# Patient Record
Sex: MALE | Race: BLACK OR AFRICAN AMERICAN | NOT HISPANIC OR LATINO | Employment: OTHER | URBAN - METROPOLITAN AREA
[De-identification: names, ages, dates, MRNs, and addresses within clinical notes are randomized per-mention and may not be internally consistent; named-entity substitution may affect disease eponyms.]

---

## 2018-09-25 ENCOUNTER — APPOINTMENT (EMERGENCY)
Dept: CT IMAGING | Facility: HOSPITAL | Age: 23
End: 2018-09-25

## 2018-09-25 ENCOUNTER — HOSPITAL ENCOUNTER (OUTPATIENT)
Facility: HOSPITAL | Age: 23
Setting detail: OBSERVATION
Discharge: HOME | End: 2018-09-26
Attending: SURGERY | Admitting: SURGERY

## 2018-09-25 ENCOUNTER — HOSPITAL ENCOUNTER (EMERGENCY)
Facility: HOSPITAL | Age: 23
End: 2018-09-25
Attending: EMERGENCY MEDICINE | Admitting: EMERGENCY MEDICINE

## 2018-09-25 VITALS
SYSTOLIC BLOOD PRESSURE: 135 MMHG | WEIGHT: 179 LBS | TEMPERATURE: 99.1 F | RESPIRATION RATE: 18 BRPM | HEART RATE: 62 BPM | OXYGEN SATURATION: 99 % | DIASTOLIC BLOOD PRESSURE: 64 MMHG

## 2018-09-25 DIAGNOSIS — S02.5XXA CLOSED FRACTURE OF TOOTH, INITIAL ENCOUNTER: ICD-10-CM

## 2018-09-25 DIAGNOSIS — K01.1 TOOTH IMPACTION: Primary | ICD-10-CM

## 2018-09-25 DIAGNOSIS — S02.650A: ICD-10-CM

## 2018-09-25 DIAGNOSIS — S02.650A: Primary | ICD-10-CM

## 2018-09-25 DIAGNOSIS — K01.1 TOOTH IMPACTION: ICD-10-CM

## 2018-09-25 DIAGNOSIS — S02.609A: ICD-10-CM

## 2018-09-25 PROCEDURE — 99284 EMERGENCY DEPT VISIT MOD MDM: CPT

## 2018-09-25 PROCEDURE — 70486 CT MAXILLOFACIAL W/O DYE: CPT

## 2018-09-25 PROCEDURE — 99219 PR INITIAL OBSERVATION CARE/DAY 50 MINUTES: CPT | Performed by: SURGERY

## 2018-09-25 RX ORDER — AMOXICILLIN 250 MG
2 CAPSULE ORAL DAILY
Status: DISCONTINUED | OUTPATIENT
Start: 2018-09-26 | End: 2018-09-26 | Stop reason: HOSPADM

## 2018-09-25 RX ORDER — OXYCODONE HYDROCHLORIDE 10 MG/1
10 TABLET ORAL EVERY 4 HOURS PRN
Status: DISCONTINUED | OUTPATIENT
Start: 2018-09-25 | End: 2018-09-26 | Stop reason: HOSPADM

## 2018-09-25 RX ORDER — SODIUM CHLORIDE 9 MG/ML
100 INJECTION, SOLUTION INTRAVENOUS CONTINUOUS
Status: DISCONTINUED | OUTPATIENT
Start: 2018-09-26 | End: 2018-09-26 | Stop reason: HOSPADM

## 2018-09-25 RX ORDER — GABAPENTIN 100 MG/1
100 CAPSULE ORAL 3 TIMES DAILY
Status: DISCONTINUED | OUTPATIENT
Start: 2018-09-25 | End: 2018-09-26 | Stop reason: HOSPADM

## 2018-09-25 RX ORDER — METHOCARBAMOL 500 MG/1
750 TABLET, FILM COATED ORAL ONCE
Status: COMPLETED | OUTPATIENT
Start: 2018-09-25 | End: 2018-09-25

## 2018-09-25 RX ORDER — CHLORHEXIDINE GLUCONATE 0.12 MG/ML
15 RINSE ORAL EVERY 12 HOURS SCHEDULED
Status: DISCONTINUED | OUTPATIENT
Start: 2018-09-25 | End: 2018-09-26 | Stop reason: HOSPADM

## 2018-09-25 RX ORDER — ONDANSETRON 2 MG/ML
4 INJECTION INTRAMUSCULAR; INTRAVENOUS EVERY 4 HOURS PRN
Status: DISCONTINUED | OUTPATIENT
Start: 2018-09-25 | End: 2018-09-26 | Stop reason: HOSPADM

## 2018-09-25 RX ORDER — ACETAMINOPHEN 325 MG/1
975 TABLET ORAL EVERY 8 HOURS SCHEDULED
Status: DISCONTINUED | OUTPATIENT
Start: 2018-09-25 | End: 2018-09-26 | Stop reason: HOSPADM

## 2018-09-25 RX ORDER — IBUPROFEN 400 MG/1
800 TABLET ORAL ONCE
Status: COMPLETED | OUTPATIENT
Start: 2018-09-25 | End: 2018-09-25

## 2018-09-25 RX ORDER — OXYCODONE HYDROCHLORIDE 5 MG/1
5 TABLET ORAL EVERY 4 HOURS PRN
Status: DISCONTINUED | OUTPATIENT
Start: 2018-09-25 | End: 2018-09-26 | Stop reason: HOSPADM

## 2018-09-25 RX ORDER — METHOCARBAMOL 500 MG/1
500 TABLET, FILM COATED ORAL EVERY 6 HOURS SCHEDULED
Status: DISCONTINUED | OUTPATIENT
Start: 2018-09-26 | End: 2018-09-26 | Stop reason: HOSPADM

## 2018-09-25 RX ADMIN — IBUPROFEN 800 MG: 400 TABLET ORAL at 18:46

## 2018-09-25 RX ADMIN — SODIUM CHLORIDE 100 ML/HR: 0.9 INJECTION, SOLUTION INTRAVENOUS at 23:48

## 2018-09-25 RX ADMIN — METHOCARBAMOL 750 MG: 500 TABLET ORAL at 18:45

## 2018-09-25 NOTE — LETTER
[address]  [address] [place] [address]  Dept: [phone]    September 26, 2018     Patient: James Gonsalez   YOB: 1995   Date of Visit: 9/25/2018       To Whom it May Concern:    James Gonsalez is under my professional care  He was seen in the hospital from 9/25/2018   to 09/26/18  He may return to work on 09/27/2018  If you have any questions or concerns, please don't hesitate to call           Sincerely,          Justus Beyer PA-C
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
## 2018-09-25 NOTE — ED NOTES
Patient does have a ride home at this time      Figueroa Eid, [address] [facility]  09/25/18 6839

## 2018-09-26 ENCOUNTER — ANESTHESIA EVENT (OUTPATIENT)
Dept: PERIOP | Facility: HOSPITAL | Age: 23
End: 2018-09-26

## 2018-09-26 ENCOUNTER — ANESTHESIA (OUTPATIENT)
Dept: PERIOP | Facility: HOSPITAL | Age: 23
End: 2018-09-26

## 2018-09-26 VITALS
HEIGHT: 74 IN | TEMPERATURE: 97.4 F | WEIGHT: 179.01 LBS | SYSTOLIC BLOOD PRESSURE: 122 MMHG | RESPIRATION RATE: 18 BRPM | HEART RATE: 60 BPM | DIASTOLIC BLOOD PRESSURE: 57 MMHG | BODY MASS INDEX: 22.97 KG/M2 | OXYGEN SATURATION: 98 %

## 2018-09-26 PROCEDURE — 99217 PR OBSERVATION CARE DISCHARGE MANAGEMENT: CPT | Performed by: SURGERY

## 2018-09-26 RX ORDER — AMOXICILLIN AND CLAVULANATE POTASSIUM 875; 125 MG/1; MG/1
1 TABLET, FILM COATED ORAL EVERY 12 HOURS SCHEDULED
Qty: 10 TABLET | Refills: 0 | Status: SHIPPED | OUTPATIENT
Start: 2018-09-26 | End: 2018-10-01

## 2018-09-26 RX ORDER — IBUPROFEN 600 MG/1
600 TABLET ORAL EVERY 8 HOURS PRN
Status: DISCONTINUED | OUTPATIENT
Start: 2018-09-26 | End: 2018-09-26 | Stop reason: HOSPADM

## 2018-09-26 RX ORDER — METHOCARBAMOL 500 MG/1
500 TABLET, FILM COATED ORAL EVERY 6 HOURS SCHEDULED
Qty: 25 TABLET | Refills: 0 | Status: SHIPPED | OUTPATIENT
Start: 2018-09-26 | End: 2020-09-11

## 2018-09-26 RX ORDER — ACETAMINOPHEN 325 MG/1
650 TABLET ORAL EVERY 6 HOURS PRN
Qty: 30 TABLET | Refills: 0 | Status: SHIPPED | OUTPATIENT
Start: 2018-09-26 | End: 2018-10-18 | Stop reason: HOSPADM

## 2018-09-26 RX ORDER — OXYCODONE HYDROCHLORIDE 5 MG/1
5 TABLET ORAL EVERY 4 HOURS PRN
Qty: 10 TABLET | Refills: 0 | Status: SHIPPED | OUTPATIENT
Start: 2018-09-26 | End: 2018-10-06

## 2018-09-26 RX ORDER — CHLORHEXIDINE GLUCONATE 0.12 MG/ML
15 RINSE ORAL 2 TIMES DAILY
Qty: 120 ML | Refills: 0 | Status: SHIPPED | OUTPATIENT
Start: 2018-09-26 | End: 2018-10-14

## 2018-09-26 RX ORDER — IBUPROFEN 600 MG/1
600 TABLET ORAL EVERY 8 HOURS PRN
Qty: 30 TABLET | Refills: 0 | Status: SHIPPED | OUTPATIENT
Start: 2018-09-26 | End: 2018-10-18 | Stop reason: HOSPADM

## 2018-09-26 RX ADMIN — METHOCARBAMOL 500 MG: 500 TABLET ORAL at 11:57

## 2018-09-26 RX ADMIN — ACETAMINOPHEN 975 MG: 325 TABLET, FILM COATED ORAL at 00:58

## 2018-09-26 RX ADMIN — OXYCODONE HYDROCHLORIDE 5 MG: 5 TABLET ORAL at 05:10

## 2018-09-26 RX ADMIN — SODIUM CHLORIDE 100 ML/HR: 0.9 INJECTION, SOLUTION INTRAVENOUS at 10:36

## 2018-09-26 RX ADMIN — SODIUM CHLORIDE 100 ML/HR: 0.9 INJECTION, SOLUTION INTRAVENOUS at 00:54

## 2018-09-26 RX ADMIN — OXYCODONE HYDROCHLORIDE 5 MG: 5 TABLET ORAL at 00:58

## 2018-09-26 RX ADMIN — HYDROMORPHONE HYDROCHLORIDE 0.5 MG: 1 INJECTION, SOLUTION INTRAMUSCULAR; INTRAVENOUS; SUBCUTANEOUS at 08:03

## 2018-09-26 RX ADMIN — GABAPENTIN 100 MG: 100 CAPSULE ORAL at 08:03

## 2018-09-26 RX ADMIN — METHOCARBAMOL 500 MG: 500 TABLET ORAL at 00:59

## 2018-09-26 RX ADMIN — CHLORHEXIDINE GLUCONATE 15 ML: 1.2 RINSE ORAL at 08:03

## 2018-09-26 RX ADMIN — CHLORHEXIDINE GLUCONATE 15 ML: 1.2 RINSE ORAL at 00:58

## 2018-09-26 RX ADMIN — OXYCODONE HYDROCHLORIDE 10 MG: 10 TABLET ORAL at 10:36

## 2018-09-26 RX ADMIN — METHOCARBAMOL 500 MG: 500 TABLET ORAL at 05:10

## 2018-09-26 RX ADMIN — IBUPROFEN 600 MG: 600 TABLET ORAL at 11:57

## 2018-09-26 RX ADMIN — ACETAMINOPHEN 975 MG: 325 TABLET, FILM COATED ORAL at 05:10

## 2018-09-26 RX ADMIN — Medication 2 TABLET: at 08:03

## 2018-09-26 NOTE — ASSESSMENT & PLAN NOTE
-patient declining surgery  -OMFS had extensive conversation with patient regarding nonsurgical option  -patient is still refusing surgery even with trauma team discussion  -will discharge patient with antibiotics for a course of 5 days  -continue sinus precautions
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
## 2018-09-26 NOTE — ED NOTES
Provider updated patient regarding admission/transfer status at this time      Selena Cooper RN  09/25/18 8691

## 2018-09-26 NOTE — EMTALA/ACUTE CARE TRANSFER
[facility]  [address] [address]  Dept: [phone]      EMTALA TRANSFER CONSENT    NAME Mikel TORRES 1995                              MRN [id number]    I have been informed of my rights regarding examination, treatment, and transfer   by Dr Halina Hayward MD    Benefits: Specialized equipment and/or services available at the receiving facility (Include comment)________________________ (Trauma service )    Risks: Potential deterioration of medical condition, Potential for delay in receiving treatment, Possible worsening of condition or death during transfer, Loss of IV, Increased discomfort during transfer      Transfer Request   I acknowledge that my medical condition has been evaluated and explained to me by the emergency department physician or other qualified medical person and/or my attending physician who has recommended and offered to me further medical examination and treatment  I understand the Hospital's obligation with respect to the treatment and stabilization of my emergency medical condition  I nevertheless request to be transferred  I release the Hospital, the doctor, and any other persons caring for me from all responsibility or liability for any injury or ill effects that may result from my transfer and agree to accept all responsibility for the consequences of my choice to transfer, rather than receive stabilizing treatment at the Hospital  I understand that because the transfer is my request, my insurance may not provide reimbursement for the services  The Hospital will assist and direct me and my family in how to make arrangements for transfer, but the hospital is not liable for any fees charged by the transport service    In spite of this understanding, I refuse to consent to further medical examination and treatment which has been offered to me, and request transfer to Accepting Facility Name, [address]; [place], [place]  I authorize the performance of emergency medical procedures and treatments upon me in both transit and upon arrival at the receiving facility  Additionally, I authorize the release of any and all medical records to the receiving facility and request they be transported with me, if possible  I authorize the performance of emergency medical procedures and treatments upon me in both transit and upon arrival at the receiving facility  Additionally, I authorize the release of any and all medical records to the receiving facility and request they be transported with me, if possible  I understand that the safest mode of transportation during a medical emergency is an ambulance and that the Hospital advocates the use of this mode of transport  Risks of traveling to the receiving facility by car, including absence of medical control, life sustaining equipment, such as oxygen, and medical personnel has been explained to me and I fully understand them  (RAMAKRISHNA CORRECT BOX BELOW)  [  ]  I consent to the stated transfer and to be transported by ambulance/helicopter  [  ]  I consent to the stated transfer, but refuse transportation by ambulance and accept full responsibility for my transportation by car  I understand the risks of non-ambulance transfers and I exonerate the Hospital and its staff from any deterioration in my condition that results from this refusal     X___________________________________________    DATE  18  TIME________  Signature of patient or legally responsible individual signing on patient behalf           RELATIONSHIP TO PATIENT_________________________          Provider Certification    NAME Isabellashy Chery                                        MELISSA 1995                              MRN [id number]    A medical screening exam was performed on the above named patient    Based on the examination:    Condition Necessitating Transfer The primary encounter diagnosis was Closed fracture of angle of mandible, initial encounter ([facility] [address] )  Diagnoses of Closed fracture of tooth, initial encounter, Tooth impaction, and Closed fracture of left side of mandible, initial encounter [facility]) were also pertinent to this visit  Patient Condition: The patient has been stabilized such that within reasonable medical probability, no material deterioration of the patient condition or the condition of the unborn child(ana) is likely to result from the transfer    Reason for Transfer: Level of Care needed not available at this facility    Transfer Requirements: [facility] [address]  [address] [place] [address]; [place], [place]   · Space available and qualified personnel available for treatment as acknowledged by    · Agreed to accept transfer and to provide appropriate medical treatment as acknowledged by       Dr Dayami Huggins  · Appropriate medical records of the examination and treatment of the patient are provided at the time of transfer   [address] [facility], [address] _______  · Transfer will be performed by qualified personnel from [facility]  and appropriate transfer equipment as required, including the use of necessary and appropriate life support measures      Provider Certification: I have examined the patient and explained the following risks and benefits of being transferred/refusing transfer to the patient/family:  General risk, such as traffic hazards, adverse weather conditions, rough terrain or turbulence, possible failure of equipment (including vehicle or aircraft), or consequences of actions of persons outside the control of the transport personnel      Based on these reasonable risks and benefits to the patient and/or the unborn child(ana), and based upon the information available at the time of the patients examination, I certify that the medical benefits reasonably to be expected from the provision of appropriate medical treatments at another medical facility outweigh the increasing risks, if any, to the individuals medical condition, and in the case of labor to the unborn child, from effecting the transfer      X____________________________________________ DATE 09/25/18        TIME_______      ORIGINAL - SEND TO MEDICAL RECORDS   COPY - SEND WITH PATIENT DURING TRANSFER

## 2018-09-26 NOTE — DISCHARGE INSTRUCTIONS
OMFS discharge instructions:  Recommendations:  Full liquid diet for 5 weeks  No clenching  No large mouth opening  Good oral hygiene  No strenuous activity for 5 weeks   No contact sports for 4 months- provided adequate healing occurs  Antibiotics for 5 days: amoxicillin  Pain management: ibuprofen, tylenol  F/U with OMFS  In 1 week PRN
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
## 2018-10-14 ENCOUNTER — APPOINTMENT (EMERGENCY)
Dept: CT IMAGING | Facility: HOSPITAL | Age: 23
End: 2018-10-14

## 2018-10-14 ENCOUNTER — HOSPITAL ENCOUNTER (EMERGENCY)
Facility: HOSPITAL | Age: 23
End: 2018-10-15
Attending: EMERGENCY MEDICINE | Admitting: EMERGENCY MEDICINE

## 2018-10-14 VITALS
RESPIRATION RATE: 20 BRPM | SYSTOLIC BLOOD PRESSURE: 129 MMHG | TEMPERATURE: 98.6 F | WEIGHT: 182 LBS | HEART RATE: 58 BPM | BODY MASS INDEX: 23.37 KG/M2 | DIASTOLIC BLOOD PRESSURE: 74 MMHG | OXYGEN SATURATION: 98 %

## 2018-10-14 DIAGNOSIS — S02.609A MANDIBULAR FRACTURE (HCC): Primary | ICD-10-CM

## 2018-10-14 DIAGNOSIS — S02.652K: ICD-10-CM

## 2018-10-14 DIAGNOSIS — S02.5XXS CLOSED FRACTURE OF TOOTH, SEQUELA: ICD-10-CM

## 2018-10-14 PROBLEM — S02.650K: Status: ACTIVE | Noted: 2018-10-14

## 2018-10-14 LAB
ALBUMIN SERPL BCP-MCNC: 4 G/DL (ref 3.5–5)
ALP SERPL-CCNC: 83 U/L (ref 46–116)
ALT SERPL W P-5'-P-CCNC: 33 U/L (ref 12–78)
ANION GAP SERPL CALCULATED.3IONS-SCNC: 7 MMOL/L (ref 4–13)
AST SERPL W P-5'-P-CCNC: 25 U/L (ref 5–45)
BASOPHILS # BLD AUTO: 0.03 THOUSANDS/ΜL (ref 0–0.1)
BASOPHILS NFR BLD AUTO: 0 % (ref 0–1)
BILIRUB SERPL-MCNC: 0.31 MG/DL (ref 0.2–1)
BUN SERPL-MCNC: 17 MG/DL (ref 5–25)
CALCIUM SERPL-MCNC: 9.2 MG/DL (ref 8.3–10.1)
CHLORIDE SERPL-SCNC: 103 MMOL/L (ref 100–108)
CO2 SERPL-SCNC: 27 MMOL/L (ref 21–32)
CREAT SERPL-MCNC: 1.19 MG/DL (ref 0.6–1.3)
EOSINOPHIL # BLD AUTO: 0.15 THOUSAND/ΜL (ref 0–0.61)
EOSINOPHIL NFR BLD AUTO: 2 % (ref 0–6)
ERYTHROCYTE [DISTWIDTH] IN BLOOD BY AUTOMATED COUNT: 13.3 % (ref 11.6–15.1)
GFR SERPL CREATININE-BSD FRML MDRD: 98 ML/MIN/1.73SQ M
GLUCOSE SERPL-MCNC: 90 MG/DL (ref 65–140)
HCT VFR BLD AUTO: 42.9 % (ref 36.5–49.3)
HGB BLD-MCNC: 13.9 G/DL (ref 12–17)
IMM GRANULOCYTES # BLD AUTO: 0.02 THOUSAND/UL (ref 0–0.2)
IMM GRANULOCYTES NFR BLD AUTO: 0 % (ref 0–2)
LACTATE SERPL-SCNC: 1.1 MMOL/L (ref 0.5–2)
LYMPHOCYTES # BLD AUTO: 1.35 THOUSANDS/ΜL (ref 0.6–4.47)
LYMPHOCYTES NFR BLD AUTO: 18 % (ref 14–44)
MCH RBC QN AUTO: 28.7 PG (ref 26.8–34.3)
MCHC RBC AUTO-ENTMCNC: 32.4 G/DL (ref 31.4–37.4)
MCV RBC AUTO: 89 FL (ref 82–98)
MONOCYTES # BLD AUTO: 0.52 THOUSAND/ΜL (ref 0.17–1.22)
MONOCYTES NFR BLD AUTO: 7 % (ref 4–12)
NEUTROPHILS # BLD AUTO: 5.37 THOUSANDS/ΜL (ref 1.85–7.62)
NEUTS SEG NFR BLD AUTO: 73 % (ref 43–75)
NRBC BLD AUTO-RTO: 0 /100 WBCS
PLATELET # BLD AUTO: 323 THOUSANDS/UL (ref 149–390)
PMV BLD AUTO: 9.4 FL (ref 8.9–12.7)
POTASSIUM SERPL-SCNC: 3.9 MMOL/L (ref 3.5–5.3)
PROT SERPL-MCNC: 7.9 G/DL (ref 6.4–8.2)
RBC # BLD AUTO: 4.84 MILLION/UL (ref 3.88–5.62)
SODIUM SERPL-SCNC: 137 MMOL/L (ref 136–145)
WBC # BLD AUTO: 7.44 THOUSAND/UL (ref 4.31–10.16)

## 2018-10-14 PROCEDURE — 96374 THER/PROPH/DIAG INJ IV PUSH: CPT

## 2018-10-14 PROCEDURE — 36415 COLL VENOUS BLD VENIPUNCTURE: CPT | Performed by: PHYSICIAN ASSISTANT

## 2018-10-14 PROCEDURE — 96361 HYDRATE IV INFUSION ADD-ON: CPT

## 2018-10-14 PROCEDURE — 85025 COMPLETE CBC W/AUTO DIFF WBC: CPT | Performed by: PHYSICIAN ASSISTANT

## 2018-10-14 PROCEDURE — 87040 BLOOD CULTURE FOR BACTERIA: CPT | Performed by: PHYSICIAN ASSISTANT

## 2018-10-14 PROCEDURE — 99285 EMERGENCY DEPT VISIT HI MDM: CPT

## 2018-10-14 PROCEDURE — 83605 ASSAY OF LACTIC ACID: CPT | Performed by: PHYSICIAN ASSISTANT

## 2018-10-14 PROCEDURE — 70487 CT MAXILLOFACIAL W/DYE: CPT

## 2018-10-14 PROCEDURE — 80053 COMPREHEN METABOLIC PANEL: CPT | Performed by: PHYSICIAN ASSISTANT

## 2018-10-14 RX ORDER — KETOROLAC TROMETHAMINE 30 MG/ML
30 INJECTION, SOLUTION INTRAMUSCULAR; INTRAVENOUS ONCE
Status: COMPLETED | OUTPATIENT
Start: 2018-10-14 | End: 2018-10-14

## 2018-10-14 RX ADMIN — SODIUM CHLORIDE 1000 ML: 0.9 INJECTION, SOLUTION INTRAVENOUS at 21:00

## 2018-10-14 RX ADMIN — IOHEXOL 85 ML: 350 INJECTION, SOLUTION INTRAVENOUS at 21:46

## 2018-10-14 RX ADMIN — KETOROLAC TROMETHAMINE 30 MG: 30 INJECTION, SOLUTION INTRAMUSCULAR at 22:38

## 2018-10-15 ENCOUNTER — ANESTHESIA EVENT (OUTPATIENT)
Dept: PERIOP | Facility: HOSPITAL | Age: 23
End: 2018-10-15

## 2018-10-15 ENCOUNTER — ANESTHESIA (OUTPATIENT)
Dept: PERIOP | Facility: HOSPITAL | Age: 23
End: 2018-10-15

## 2018-10-15 ENCOUNTER — HOSPITAL ENCOUNTER (OUTPATIENT)
Facility: HOSPITAL | Age: 23
Setting detail: OBSERVATION
Discharge: HOME | End: 2018-10-18
Attending: SURGERY | Admitting: SURGERY

## 2018-10-15 DIAGNOSIS — K01.1 TOOTH IMPACTION: ICD-10-CM

## 2018-10-15 DIAGNOSIS — S02.5XXA CLOSED FRACTURE OF TOOTH, INITIAL ENCOUNTER: ICD-10-CM

## 2018-10-15 DIAGNOSIS — S02.652K: ICD-10-CM

## 2018-10-15 DIAGNOSIS — R22.0 JAW SWELLING: ICD-10-CM

## 2018-10-15 DIAGNOSIS — S02.650A: Primary | ICD-10-CM

## 2018-10-15 LAB
ABO GROUP BLD: NORMAL
ANION GAP SERPL CALCULATED.3IONS-SCNC: 4 MMOL/L (ref 4–13)
BLD GP AB SCN SERPL QL: NEGATIVE
BUN SERPL-MCNC: 13 MG/DL (ref 5–25)
CALCIUM SERPL-MCNC: 8.6 MG/DL (ref 8.3–10.1)
CHLORIDE SERPL-SCNC: 106 MMOL/L (ref 100–108)
CO2 SERPL-SCNC: 26 MMOL/L (ref 21–32)
CREAT SERPL-MCNC: 1.18 MG/DL (ref 0.6–1.3)
ERYTHROCYTE [DISTWIDTH] IN BLOOD BY AUTOMATED COUNT: 13.4 % (ref 11.6–15.1)
GFR SERPL CREATININE-BSD FRML MDRD: 99 ML/MIN/1.73SQ M
GLUCOSE P FAST SERPL-MCNC: 98 MG/DL (ref 65–99)
GLUCOSE SERPL-MCNC: 98 MG/DL (ref 65–140)
HCT VFR BLD AUTO: 39.1 % (ref 36.5–49.3)
HGB BLD-MCNC: 12.8 G/DL (ref 12–17)
MCH RBC QN AUTO: 28.9 PG (ref 26.8–34.3)
MCHC RBC AUTO-ENTMCNC: 32.7 G/DL (ref 31.4–37.4)
MCV RBC AUTO: 88 FL (ref 82–98)
PLATELET # BLD AUTO: 290 THOUSANDS/UL (ref 149–390)
PMV BLD AUTO: 10 FL (ref 8.9–12.7)
POTASSIUM SERPL-SCNC: 3.9 MMOL/L (ref 3.5–5.3)
RBC # BLD AUTO: 4.43 MILLION/UL (ref 3.88–5.62)
RH BLD: POSITIVE
SODIUM SERPL-SCNC: 136 MMOL/L (ref 136–145)
SPECIMEN EXPIRATION DATE: NORMAL
WBC # BLD AUTO: 9.2 THOUSAND/UL (ref 4.31–10.16)

## 2018-10-15 PROCEDURE — 99219 PR INITIAL OBSERVATION CARE/DAY 50 MINUTES: CPT | Performed by: SURGERY

## 2018-10-15 PROCEDURE — 99284 EMERGENCY DEPT VISIT MOD MDM: CPT

## 2018-10-15 PROCEDURE — 86900 BLOOD TYPING SEROLOGIC ABO: CPT | Performed by: EMERGENCY MEDICINE

## 2018-10-15 PROCEDURE — 86850 RBC ANTIBODY SCREEN: CPT | Performed by: EMERGENCY MEDICINE

## 2018-10-15 PROCEDURE — 80048 BASIC METABOLIC PNL TOTAL CA: CPT | Performed by: EMERGENCY MEDICINE

## 2018-10-15 PROCEDURE — 86901 BLOOD TYPING SEROLOGIC RH(D): CPT | Performed by: EMERGENCY MEDICINE

## 2018-10-15 PROCEDURE — 85027 COMPLETE CBC AUTOMATED: CPT | Performed by: EMERGENCY MEDICINE

## 2018-10-15 RX ORDER — OXYCODONE HYDROCHLORIDE 5 MG/1
5 TABLET ORAL EVERY 4 HOURS PRN
Status: DISCONTINUED | OUTPATIENT
Start: 2018-10-15 | End: 2018-10-18

## 2018-10-15 RX ORDER — HEPARIN SODIUM 5000 [USP'U]/ML
5000 INJECTION, SOLUTION INTRAVENOUS; SUBCUTANEOUS EVERY 8 HOURS SCHEDULED
Status: DISCONTINUED | OUTPATIENT
Start: 2018-10-15 | End: 2018-10-18 | Stop reason: HOSPADM

## 2018-10-15 RX ORDER — OXYCODONE HYDROCHLORIDE 10 MG/1
10 TABLET ORAL EVERY 4 HOURS PRN
Status: DISCONTINUED | OUTPATIENT
Start: 2018-10-15 | End: 2018-10-18

## 2018-10-15 RX ORDER — ACETAMINOPHEN 325 MG/1
650 TABLET ORAL EVERY 4 HOURS PRN
Status: DISCONTINUED | OUTPATIENT
Start: 2018-10-15 | End: 2018-10-15

## 2018-10-15 RX ORDER — HYDROMORPHONE HCL/PF 1 MG/ML
0.5 SYRINGE (ML) INJECTION EVERY 4 HOURS PRN
Status: DISCONTINUED | OUTPATIENT
Start: 2018-10-15 | End: 2018-10-18 | Stop reason: HOSPADM

## 2018-10-15 RX ORDER — OXYCODONE HYDROCHLORIDE 5 MG/1
5 TABLET ORAL EVERY 4 HOURS PRN
Status: DISCONTINUED | OUTPATIENT
Start: 2018-10-15 | End: 2018-10-15

## 2018-10-15 RX ORDER — OXYCODONE HYDROCHLORIDE 5 MG/1
5 TABLET ORAL EVERY 6 HOURS PRN
COMMUNITY
End: 2018-10-18 | Stop reason: HOSPADM

## 2018-10-15 RX ORDER — ACETAMINOPHEN 325 MG/1
975 TABLET ORAL EVERY 8 HOURS SCHEDULED
Status: DISCONTINUED | OUTPATIENT
Start: 2018-10-15 | End: 2018-10-18

## 2018-10-15 RX ORDER — CHLORHEXIDINE GLUCONATE 0.12 MG/ML
225 RINSE ORAL ONCE
Status: COMPLETED | OUTPATIENT
Start: 2018-10-15 | End: 2018-10-15

## 2018-10-15 RX ORDER — METHOCARBAMOL 500 MG/1
500 TABLET, FILM COATED ORAL EVERY 6 HOURS SCHEDULED
Status: DISCONTINUED | OUTPATIENT
Start: 2018-10-15 | End: 2018-10-18 | Stop reason: HOSPADM

## 2018-10-15 RX ADMIN — METHOCARBAMOL 500 MG: 500 TABLET ORAL at 23:33

## 2018-10-15 RX ADMIN — ACETAMINOPHEN 650 MG: 325 TABLET, FILM COATED ORAL at 08:56

## 2018-10-15 RX ADMIN — CEFAZOLIN SODIUM 2000 MG: 2 SOLUTION INTRAVENOUS at 11:14

## 2018-10-15 RX ADMIN — MORPHINE SULFATE 2 MG: 2 INJECTION, SOLUTION INTRAMUSCULAR; INTRAVENOUS at 02:54

## 2018-10-15 RX ADMIN — CEFAZOLIN SODIUM 2000 MG: 2 SOLUTION INTRAVENOUS at 02:46

## 2018-10-15 RX ADMIN — CEFAZOLIN SODIUM 2000 MG: 2 SOLUTION INTRAVENOUS at 18:25

## 2018-10-15 RX ADMIN — ACETAMINOPHEN 975 MG: 325 TABLET, FILM COATED ORAL at 21:57

## 2018-10-15 RX ADMIN — METHOCARBAMOL 500 MG: 500 TABLET ORAL at 18:24

## 2018-10-15 RX ADMIN — HYDROMORPHONE HYDROCHLORIDE 0.5 MG: 1 INJECTION, SOLUTION INTRAMUSCULAR; INTRAVENOUS; SUBCUTANEOUS at 15:42

## 2018-10-15 RX ADMIN — OXYCODONE HYDROCHLORIDE 10 MG: 10 TABLET ORAL at 20:45

## 2018-10-15 RX ADMIN — METHOCARBAMOL 500 MG: 500 TABLET ORAL at 11:18

## 2018-10-15 RX ADMIN — OXYCODONE HYDROCHLORIDE 5 MG: 5 TABLET ORAL at 11:20

## 2018-10-15 RX ADMIN — MORPHINE SULFATE 2 MG: 2 INJECTION, SOLUTION INTRAMUSCULAR; INTRAVENOUS at 08:56

## 2018-10-15 RX ADMIN — CHLORHEXIDINE GLUCONATE 225 ML: 1.2 RINSE ORAL at 18:24
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
## 2018-10-15 NOTE — EMTALA/ACUTE CARE TRANSFER
[address]  [address] [address]  Dept: [phone]      EMTALA TRANSFER CONSENT    NAME Neelam Moreno                                         1995                              MRN [id number]    I have been informed of my rights regarding examination, treatment, and transfer   by Dr Damian Rodriguez DO    Benefits: Specialized equipment and/or services available at the receiving facility (Include comment)________________________    Risks: Potential for delay in receiving treatment      Transfer Request   I acknowledge that my medical condition has been evaluated and explained to me by the emergency department physician or other qualified medical person and/or my attending physician who has recommended and offered to me further medical examination and treatment  I understand the Hospital's obligation with respect to the treatment and stabilization of my emergency medical condition  I nevertheless request to be transferred  I release the Hospital, the doctor, and any other persons caring for me from all responsibility or liability for any injury or ill effects that may result from my transfer and agree to accept all responsibility for the consequences of my choice to transfer, rather than receive stabilizing treatment at the Hospital  I understand that because the transfer is my request, my insurance may not provide reimbursement for the services  The Hospital will assist and direct me and my family in how to make arrangements for transfer, but the hospital is not liable for any fees charged by the transport service  In spite of this understanding, I refuse to consent to further medical examination and treatment which has been offered to me, and request transfer to  [place] Name, [address] : [address]   I authorize the performance of emergency medical procedures and treatments upon me in both transit and upon arrival at the receiving facility  Additionally, I authorize the release of any and all medical records to the receiving facility and request they be transported with me, if possible  I authorize the performance of emergency medical procedures and treatments upon me in both transit and upon arrival at the receiving facility  Additionally, I authorize the release of any and all medical records to the receiving facility and request they be transported with me, if possible  I understand that the safest mode of transportation during a medical emergency is an ambulance and that the Hospital advocates the use of this mode of transport  Risks of traveling to the receiving facility by car, including absence of medical control, life sustaining equipment, such as oxygen, and medical personnel has been explained to me and I fully understand them  (RAMAKRISHNA CORRECT BOX BELOW)  [  ]  I consent to the stated transfer and to be transported by ambulance/helicopter  [  ]  I consent to the stated transfer, but refuse transportation by ambulance and accept full responsibility for my transportation by car  I understand the risks of non-ambulance transfers and I exonerate the Hospital and its staff from any deterioration in my condition that results from this refusal     X___________________________________________    DATE  10/14/18  TIME________  Signature of patient or legally responsible individual signing on patient behalf           RELATIONSHIP TO PATIENT_________________________          Provider Certification    NAME Sarita Crespo                                        [facility] 1995                              MRN [id number]    A medical screening exam was performed on the above named patient  Based on the examination:    Condition Necessitating Transfer The encounter diagnosis was Mandibular fracture ([address] )      Patient Condition: The patient has been stabilized such that within reasonable medical probability, no material deterioration of the patient condition or the condition of the unborn child(ana) is likely to result from the transfer    Reason for Transfer: Level of Care needed not available at this facility    Transfer Requirements: [facility]   · Space available and qualified personnel available for treatment as acknowledged by    · Agreed to accept transfer and to provide appropriate medical treatment as acknowledged by       Dr Benji Soto  · Appropriate medical records of the examination and treatment of the patient are provided at the time of transfer   [facility], [address] _______  · Transfer will be performed by qualified personnel from    and appropriate transfer equipment as required, including the use of necessary and appropriate life support measures  Provider Certification: I have examined the patient and explained the following risks and benefits of being transferred/refusing transfer to the patient/family:  General risk, such as traffic hazards, adverse weather conditions, rough terrain or turbulence, possible failure of equipment (including vehicle or aircraft), or consequences of actions of persons outside the control of the transport personnel      Based on these reasonable risks and benefits to the patient and/or the unborn child(ana), and based upon the information available at the time of the patients examination, I certify that the medical benefits reasonably to be expected from the provision of appropriate medical treatments at another medical facility outweigh the increasing risks, if any, to the individuals medical condition, and in the case of labor to the unborn child, from effecting the transfer      X____________________________________________ DATE 10/14/18        TIME_______      ORIGINAL - SEND TO MEDICAL RECORDS   COPY - SEND WITH PATIENT DURING TRANSFER

## 2018-10-15 NOTE — PHYSICAL THERAPY NOTE
PT SCREEN    PT CONSULT RECEIVED  CHART REVIEW PERFORMED  UPON ARRIVAL, PATIENT (I) MOBILIZING IN ROOM  DISCUSSED PT ROLE IN THE INPATIENT SETTING  NO CONCERNS IN TERMS OF MOBILITY IDENTIFIED  NO SKILLED IP PT NEEDS AT THIS TIME  SAFE TO DC HOME WHEN MEDICALLY APPROPRATE FROM MOBILITY ASPECT      Isabela Biggs, PT

## 2018-10-15 NOTE — ED NOTES
Report called to Marc at [facility]  Transport at bedside to transport pt  Belonging sent       Dexter Tafoya RN  10/15/18 9681

## 2018-10-15 NOTE — PROGRESS NOTES
OMFS    20 yom with mandible nonunion, left  space infection with non restorable #17  Plan for OR today  OR unable to accommodate us today  Will try to accommodate for tomorrow 10/16/18, pending surgeon coverage  I discussed this with the team and the patient       NPO after 2400 hours 10/16  Unasyn 3 g q6h  Peridex   Following closely   Please call with any questions     Rosy Sheppard DMD

## 2018-10-15 NOTE — ASSESSMENT & PLAN NOTE
-OMFS consulted, appreciate input  -antibiotics started will continue  -requires operative intervention  -continue to monitor  -no issues with breathing or swallowing at this time  -swelling has increased however since last seen in the hospital on previous admission  -patient agreeable to surgery  -continue p r n  pain management

## 2018-10-15 NOTE — ED PROVIDER NOTES
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 Taking?   acetaminophen (TYLENOL) 325 mg tablet   No Yes   Sig: Take 2 tablets (650 mg total) by mouth every 6 (six) hours as needed for mild pain   ibuprofen (MOTRIN) 600 mg tablet   No Yes   Sig: Take 1 tablet (600 mg total) by mouth every 8 (eight) hours as needed for mild pain   methocarbamol (ROBAXIN) 500 mg tablet   No Yes   Sig: Take 1 tablet (500 mg total) by mouth every 6 (six) hours      Facility-Administered Medications: None       History reviewed  No pertinent past medical history  History reviewed  No pertinent surgical history  History reviewed  No pertinent family history  I have reviewed and agree with the history as documented  Social History   Substance Use Topics    Smoking status: Never Smoker    Smokeless tobacco: Never Used    Alcohol use No        Review of Systems   Constitutional: Negative for activity change, appetite change and fatigue  HENT: Negative for nosebleeds, sneezing, sore throat, trouble swallowing and voice change      Eyes: Negative for photophobia, pain and visual disturbance  Respiratory: Negative for apnea, choking and stridor  Cardiovascular: Negative for palpitations and leg swelling  Gastrointestinal: Negative for anal bleeding and constipation  Endocrine: Negative for cold intolerance, heat intolerance, polydipsia and polyphagia  Genitourinary: Negative for decreased urine volume, enuresis, frequency, genital sores and urgency  Musculoskeletal: Negative for joint swelling and myalgias  Allergic/Immunologic: Negative for environmental allergies and food allergies  Neurological: Negative for tremors, seizures, speech difficulty and weakness  Hematological: Negative for adenopathy  Psychiatric/Behavioral: Negative for behavioral problems, decreased concentration, dysphoric mood and hallucinations  Physical Exam  Physical Exam   Constitutional: He is oriented to person, place, and time  He appears well-developed and well-nourished  No distress  HENT:   Head: Normocephalic and atraumatic  Right Ear: External ear normal    Left Ear: External ear normal    Nose: Nose normal    Mouth/Throat: Oropharynx is clear and moist    Eyes: Pupils are equal, round, and reactive to light  Conjunctivae and EOM are normal    Neck: Normal range of motion  Neck supple  Cardiovascular: Normal rate, regular rhythm and normal heart sounds  Exam reveals no gallop and no friction rub  No murmur heard  Pulmonary/Chest: Effort normal and breath sounds normal  No respiratory distress  He has no wheezes  Abdominal: Soft  Bowel sounds are normal    Neurological: He is alert and oriented to person, place, and time  Skin: Skin is warm and dry  He is not diaphoretic  Psychiatric: He has a normal mood and affect  His behavior is normal    Vitals reviewed        Vital Signs  ED Triage Vitals [10/14/18 2033]   Temperature Pulse Respirations Blood Pressure SpO2   98 6 °F (37 °C) 60 20 120/67 99 %      Temp Source Heart Rate Source Patient Position - Orthostatic VS BP Location FiO2 (%)   Temporal Monitor Sitting Right arm --      Pain Score       --           Vitals:    10/14/18 2033   BP: 120/67   Pulse: 60   Patient Position - Orthostatic VS: Sitting       Visual Acuity      ED Medications  Medications   sodium chloride 0 9 % bolus 1,000 mL (0 mL Intravenous Stopped 10/14/18 2208)   iohexol (OMNIPAQUE) 350 MG/ML injection (MULTI-DOSE) 85 mL (85 mL Intravenous Given 10/14/18 2146)   ketorolac (TORADOL) injection 30 mg (30 mg Intravenous Given 10/14/18 2238)       Diagnostic Studies  Results Reviewed     Procedure Component Value Units Date/Time    Lactic acid, plasma [[id number]]  (Normal) Collected:  10/14/18 2059    Lab Status:  Final result Specimen:  Blood from Arm, Right Updated:  10/14/18 2124     LACTIC ACID 1 1 mmol/L     Narrative:         Result may be elevated if tourniquet was used during collection  Comprehensive metabolic panel [[id number]] Collected:  10/14/18 2059    Lab Status:  Final result Specimen:  Blood from Arm, Right Updated:  10/14/18 2121     Sodium 137 mmol/L      Potassium 3 9 mmol/L      Chloride 103 mmol/L      CO2 27 mmol/L      ANION GAP 7 mmol/L      BUN 17 mg/dL      Creatinine 1 19 mg/dL      Glucose 90 mg/dL      Calcium 9 2 mg/dL      AST 25 U/L      ALT 33 U/L      Alkaline Phosphatase 83 U/L      Total Protein 7 9 g/dL      Albumin 4 0 g/dL      Total Bilirubin 0 31 mg/dL      eGFR 98 ml/min/1 73sq m     Narrative:         National Kidney Disease Education Program recommendations are as follows:  GFR calculation is accurate only with a steady state creatinine  Chronic Kidney disease less than 60 ml/min/1 73 sq  meters  Kidney failure less than 15 ml/min/1 73 sq  meters      CBC and differential [[id number]] Collected:  10/14/18 2059    Lab Status:  Final result Specimen:  Blood from Arm, Right Updated:  10/14/18 2111     WBC 7 44 Thousand/uL      RBC 4 84 Million/uL      Hemoglobin 13 9 g/dL      Hematocrit 42 9 %      MCV 89 fL      MCH 28 7 pg      MCHC 32 4 g/dL      RDW 13 3 %      MPV 9 4 fL      Platelets 591 Thousands/uL      nRBC 0 /100 WBCs      Neutrophils Relative 73 %      Immat GRANS % 0 %      Lymphocytes Relative 18 %      Monocytes Relative 7 %      Eosinophils Relative 2 %      Basophils Relative 0 %      Neutrophils Absolute 5 37 Thousands/µL      Immature Grans Absolute 0 02 Thousand/uL      Lymphocytes Absolute 1 35 Thousands/µL      Monocytes Absolute 0 52 Thousand/µL      Eosinophils Absolute 0 15 Thousand/µL      Basophils Absolute 0 03 Thousands/µL     Blood culture #1 [[id number]] Collected:  10/14/18 2059    Lab Status: In process Specimen:  Blood from Arm, Right Updated:  10/14/18 2104    Blood culture #2 [[id number]] Collected:  10/14/18 2055    Lab Status: In process Specimen:  Blood from Arm, Left Updated:  10/14/18 2058                 CT facial bones w contrast   Final Result by Gisele Kendall DO (10/14 2159)      Small fluid collection involving the region of the angle of the left mandible  Moderate overlying soft tissue swelling  Nondisplaced fracture of the left mandibular ramus and angle of the mandible  The study was marked in [facility] for immediate notification  Workstation performed: [id number]                    Procedures  Procedures       Phone Contacts  ED Phone Contact    ED Course                               MDM  Number of Diagnoses or Management Options  Mandibular fracture [facility]):   Diagnosis management comments: Spoke with the on-call doctor for OMFS Dr Charlie Awad  She instructed me that the patient will likely need to have the mandible fixated  I spoke with the patient and informed him of the prognosis of not having the mandible fixed and also the procedure in detail  The patient has agreed to from service transfer and fixation of the mandible      CritCare Time    Disposition  Final diagnoses:   Mandibular fracture ([facility] [address] )     Time reflects when diagnosis was documented in both MDM as applicable and the Disposition within this note     Time User Action Codes Description Comment    10/14/2018 10:25 PM Uzair Landa Add [W44 [id number]] Mandibular fracture [facility])       ED Disposition     ED Disposition Condition Comment    Transfer to Another [address] [place] [address] should be transferred out to [facility]  MD Documentation      Most Recent Value   Patient Condition  The patient has been stabilized such that within reasonable medical probability, no material deterioration of the patient condition or the condition of the unborn child(ana) is likely to result from the transfer   Reason for Transfer  Level of Care needed not available at this facility   Benefits of Transfer  Specialized equipment and/or services available at the receiving facility (Include comment)________________________   Risks of Transfer  Potential for delay in receiving treatment   Accepting Physician  Dr Granados [facility] Name, Robert Frank   Sending MD Linus Coronado PA-C   Provider Certification  General risk, such as traffic hazards, adverse weather conditions, rough terrain or turbulence, possible failure of equipment (including vehicle or aircraft), or consequences of actions of persons outside the control of the transport personnel      RN Documentation      [facility] Name, Robert Frank      Follow-up Information    None         Patient's Medications   Discharge Prescriptions    No medications on file     No discharge procedures on file      ED Provider  Electronically Signed by           Nicole Rogers PA-C  10/14/18 4395

## 2018-10-15 NOTE — SOCIAL WORK
CM met with pt and pt aware cm role at discharge  Pt lives in an house , there are 7 steps to get in and 2nd floor bedroom   Prior to admission pt was independent all ADL"S   Pt denies any DME's , VNA or SNF   [facility]   When medically clear friend will transport home  CM reviewed d/c planning process including the following: identifying help at home, patient preference for d/c planning needs, Discharge Lounge, [facility] Meds to Bed program, availability of treatment team to discuss questions or concerns patient and/or family may have regarding understanding medications and recognizing signs and symptoms once discharged  CM also encouraged patient to follow up with all recommended appointments after discharge  Patient advised of importance for patient and family to participate in managing patients medical well being  Discharge checklist discussed with patient and family

## 2018-10-15 NOTE — H&P
H&P Exam - Trauma   Terrie Smith 20 y o  male MRN: [id number]  Unit/Bed#: ED 15 Encounter: [id number]    Assessment/Plan   Trauma Alert: Evaluation  Model of Arrival: Ambulance  Trauma Team: Issa Ruelas  Consultants: Oral Maxillofacial: 10:50 PM 10/15/18  Time Called 10:50PM    Trauma Active Problems:   Nondisplaced fracture of the left angle of the mandible with 3rd molar impaction  Trauma Plan:   Nondisplaced fracture of the left angle mandible with 3rd molar impaction  -OMFS consulted, patient to go to OR 10/15 for tooth extraction and ORIF of mandible   -pain control  -NPO  -ancef    Dispo: Admit    Chief Complaint:  Left jaw pain    History of Present Illness   HPI:  Terrie Smith is a 20 y o  male who presents with left jaw pain  Patient states that his symptoms began acutely upon awakening today at 5:00 p m  He states that he had a fracture that was diagnosed on 09/25  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  He had signed out against medical advice, but was given amoxicillin and Tylenol Motrin for pain control  He was doing well, until yesterday when he noticed that when he woke up from his nap, he had swelling in the left side of his face, was unable to open his mouth  He denies any fevers chills or difficulty breathing  He denies any difficulty tolerating his secretions  He endorses some difficulty opening his mouth beyond 1 cm  Otherwise on ROS, denying any other symptoms  Mechanism: Boxing    Review of Systems   Constitutional: Negative for chills, fatigue and fever  HENT: Positive for facial swelling  Negative for congestion, drooling, ear discharge, nosebleeds, sore throat, trouble swallowing and voice change  Eyes: Negative for redness and visual disturbance  Respiratory: Negative for shortness of breath and wheezing  Cardiovascular: Negative for chest pain and palpitations     Gastrointestinal: Negative for abdominal pain and diarrhea  Endocrine: Negative for polyuria  Genitourinary: Negative for difficulty urinating and testicular pain  Musculoskeletal: Negative for back pain and neck stiffness  Left jaw pain   Skin: Negative for rash and wound  Neurological: Negative for seizures, speech difficulty and headaches  Psychiatric/Behavioral: Negative for dysphoric mood and hallucinations  All other systems reviewed and are negative  Historical Information     History reviewed  No pertinent past medical history  History reviewed  No pertinent surgical history  Social History   History   Alcohol Use No     History   Drug Use No     History   Smoking Status    Never Smoker   Smokeless Tobacco    Never Used       There is no immunization history on file for this patient  Family History: Non-contributory    Meds/Allergies   current meds:   Current Facility-Administered Medications   Medication Dose Route Frequency    acetaminophen (TYLENOL) tablet 650 mg  650 mg Oral Q4H PRN    ceFAZolin (ANCEF) IVPB (premix) 2,000 mg  2,000 mg Intravenous Q8H    morphine injection 2 mg  2 mg Intravenous Q1H PRN    oxyCODONE (ROXICODONE) IR tablet 5 mg  5 mg Oral Q4H PRN       No Known Allergies      PHYSICAL EXAM      Objective   Vitals:   First set: Temperature: 97 8 °F (36 6 °C) (10/15/18 0130)  Pulse: 63 (10/15/18 0130)  Respirations: 16 (10/15/18 0130)  Blood Pressure: 128/65 (10/15/18 0130)    Primary Survey:   (A) Airway: in tact  (B) Breathing: CTAB  (C) Circulation: Pulses:   pedal  2/4 and radial  2/4  (D) Disabliity:  GCS Total:  15  (E) Expose:  Completed    Secondary Survey: (Click on Physical Exam tab above)  Physical Exam   Constitutional: He is oriented to person, place, and time  He appears well-developed and well-nourished  No distress  HENT:   Head: Normocephalic and atraumatic     Right Ear: External ear normal    Left Ear: External ear normal    Mouth/Throat: Oropharynx is clear and moist    Left facial swelling near angle of left mandible, tender to touch, non erythematous  Patient only able to open mouth 1 cm  No dental carries noted when examining inside mouth  Tolerating secretions well  Eyes: Pupils are equal, round, and reactive to light  Neck: Normal range of motion  Cardiovascular: Normal rate, regular rhythm, normal heart sounds and intact distal pulses  Pulmonary/Chest: Effort normal and breath sounds normal  No respiratory distress  He has no wheezes  Abdominal: Soft  There is no tenderness  There is no rebound  Musculoskeletal: Normal range of motion  Neurological: He is alert and oriented to person, place, and time  No cranial nerve deficit  Skin: Skin is warm and dry  He is not diaphoretic  No erythema  Vitals reviewed  Invasive Devices     Peripheral Intravenous Line            Peripheral IV 10/14/18 Right Antecubital less than 1 day                Lab Results:   BMP/CMP:   Lab Results   Component Value Date     10/14/2018    K 3 9 10/14/2018     10/14/2018    CO2 27 10/14/2018    BUN 17 10/14/2018    CREATININE 1 19 10/14/2018    CALCIUM 9 2 10/14/2018    AST 25 10/14/2018    ALT 33 10/14/2018    ALKPHOS 83 10/14/2018    EGFR 98 10/14/2018   , CBC:   Lab Results   Component Value Date    WBC 7 44 10/14/2018    HGB 13 9 10/14/2018    HCT 42 9 10/14/2018    MCV 89 10/14/2018     10/14/2018    MCH 28 7 10/14/2018    MCHC 32 4 10/14/2018    RDW 13 3 10/14/2018    MPV 9 4 10/14/2018    NRBC 0 10/14/2018    and Coagulation: No results found for: PT, INR, APTT  Imaging/EKG Studies: Results: I have personally reviewed pertinent reports  CT facial bones:  Small fluid collection involving the region of the angle of the left mandible  Moderate overlying soft tissue swelling  Nondisplaced fracture of the left mandibular ramus and angle of the mandible      Code Status: Level 1 - Full Code  Advance Directive and Living Will:      Power of :    POLST:

## 2018-10-15 NOTE — PROGRESS NOTES
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   Assessment & Plan    -OMFS consulted, appreciate input  -Continue abx  -requires operative intervention  -continue to monitor  -no issues with breathing or swallowing at this time  -patient agreeable to surgery  -continue p r n  pain management  -Hopefully OR today with OMFS         Nurse-patient-provider rounds completed today with the patient's nurse      Disposition: Possible OR today with OMFS    Subjective: Patient sleeping comfortably, offers no complaints this AM    Objective:    Meds/Allergies   Prescriptions Prior to Admission   Medication Sig Dispense Refill Last Dose    acetaminophen (TYLENOL) 325 mg tablet Take 2 tablets (650 mg total) by mouth every 6 (six) hours as needed for mild pain 30 tablet 0 10/14/2018 at Unknown time    ibuprofen (MOTRIN) 600 mg tablet Take 1 tablet (600 mg total) by mouth every 8 (eight) hours as needed for mild pain 30 tablet 0 10/14/2018 at Unknown time    methocarbamol (ROBAXIN) 500 mg tablet Take 1 tablet (500 mg total) by mouth every 6 (six) hours 25 tablet 0 10/14/2018 at Unknown time    oxyCODONE (ROXICODONE) 5 mg immediate release tablet Take 5 mg by mouth every 6 (six) hours as needed for moderate pain   10/14/2018 at Unknown time       Current Facility-Administered Medications:     acetaminophen (TYLENOL) tablet 975 mg, 975 mg, Oral, Q8H [address], Charyl Pierre Munson PA-C    ceFAZolin (ANCEF) IVPB (premix) 2,000 mg, 2,000 mg, Intravenous, Q8H, Yared Hancock MD, Last Rate: 100 mL/hr at 10/15/18 1825, 2,000 mg at 10/15/18 1825    heparin (porcine) subcutaneous injection 5,000 Units, 5,000 Units, Subcutaneous, Q8H [address], Tari Ye MD    HYDROmorphone (DILAUDID) injection 0 5 mg, 0 5 mg, Intravenous, Q4H PRN, Carilyn Heady, PA-C, 0 5 mg at 10/15/18 1542    methocarbamol (ROBAXIN) tablet 500 mg, 500 mg, Oral, Q6H [address], Carilyn Heady, PA-C, 500 mg at 10/15/18 1824    oxyCODONE (ROXICODONE) immediate release tablet 10 mg, 10 mg, Oral, Q4H PRN, Carilyn Heady, PA-C    oxyCODONE (ROXICODONE) IR tablet 5 mg, 5 mg, Oral, Q4H PRN, Carilyn Heady, PA-C, 5 mg at 10/15/18 1120    Vitals: Blood pressure 112/56, pulse 83, temperature 99 2 °F (37 3 °C), temperature source Oral, resp  rate 16, height 6' 2" (1 88 m), weight 82 6 kg (182 lb), SpO2 98 %  Body mass index is 23 37 kg/m²  SpO2: SpO2: 98 %  Temp (24hrs), Av 2 °F (37 3 °C), Min:97 8 °F (36 6 °C), Max:101 6 °F (38 7 °C)  Current: Temperature: 99 2 °F (37 3 °C)    ABG: No results found for: PHART, WJE6FYP, PO2ART, MRN5RSW, [id number], BEART, SOURCE      Intake/Output Summary (Last 24 hours) at 10/15/18 1913  Last data filed at 10/15/18 185   Gross per 24 hour   Intake             1000 ml   Output              575 ml   Net              425 ml       Invasive Devices     Peripheral Intravenous Line            Peripheral IV 10/14/18 Right Antecubital less than 1 day                          Nutrition/GI Proph/Bowel Reg: Dental soft diet    Physical Exam:     GENERAL APPEARANCE: Patient in no acute distress  HEENT: NCAT; PERRL, EOMs intact; Mucous membranes moist   Facial swelling and tenderness over left mandible  NECK / BACK: nontender, Swelling of left side of neck  No stridor  CV: Regular rate and rhythm; + S1, S2; no murmur/gallops/rubs appreciated  CHEST / LUNGS: Clear to auscultation; no wheezes/rales/rhonci  ABD: NABS; soft; non-distended; non-tender    EXT: +2 pulses bilaterally upper & lower extremities; no clubbing/cyanosis/edema  NEURO: GCS 15; no focal neurologic deficits; neurovascularly intact  SKIN: Warm, dry and well perfused; no rash; no jaundice  Lab Results: Results: I have personally reviewed pertinent reports  Imaging/EKG Studies: Results: I have personally reviewed pertinent reports      VTE Prophylaxis: SubQ heparin    Valerie Santo MD  10/15/2018

## 2018-10-15 NOTE — ASSESSMENT & PLAN NOTE
-OMFS consulted, appreciate input  -Continue abx  -requires operative intervention  -continue to monitor  -no issues with breathing or swallowing at this time  -patient agreeable to surgery  -continue p r n  pain management  -Hopefully OR today with OMFS

## 2018-10-15 NOTE — CONSULTS
Patient Name: Shayne Zavala YOB: 1995    Medical Record No : [id number]     Admit/Registration Date: 10/15/2018  1:34 AM  Date of Consult: 10/15/18      Oral and Maxillofacial Surgery Consult Note    Assessment:  20 y o  male with non repaired Left mandible fracture involving tooth #17, with acute cellulitis    Plan/Recs:  Consent for ORIF of Left mandible fracture and extraction of tooth #17 obtained  Keep NPO  Continue IV abx  Continue pain management  To OR when called    -----------------------------------------    20 y o  male is admitted to [address] [place] [address] as transfer from [facility] ED, where we presented with 1 day episode of swelling and pain to L mandible  HPI: pt is known to OMS service, he presented appx 3 weeks ago with minimally displaced fx of Left mandible and he declined treatment at that time  He was advised at that time to follow a strict full liquid diet for 4 weeks  He reports that "everything was well" and that he started biting on apples last week "as he had no pain"- then swelling and pain developed the day before  History reviewed  No pertinent past medical history  History reviewed  No pertinent surgical history  No Known Allergies    Social History     Social History    Marital status: Single     Spouse name: N/A    Number of children: N/A    Years of education: N/A     Occupational History    Not on file       Social History Main Topics    Smoking status: Never Smoker    Smokeless tobacco: Never Used    Alcohol use No    Drug use: No    Sexual activity: Not on file     Other Topics Concern    Not on file     Social History Narrative    No narrative on file       Scheduled Medications    Current Facility-Administered Medications:  acetaminophen 975 mg Oral Q8H [address] Eva Victor PA-C    cefazolin 2,000 mg Intravenous Q8H Boris Cintron MD Last Rate: 2,000 mg (10/15/18 1114)   HYDROmorphone 0 5 mg Intravenous Q4H PRN Eva Victor PA-C    methocarbamol 500 mg Oral Q6H CHRISSY Mary Grace Last, PA-C    oxyCODONE 10 mg Oral Q4H PRN Mary Grace Last, PA-C    oxyCODONE 5 mg Oral Q4H PRN Mary Grace Last, PA-C        PRN Medications  HYDROmorphone    oxyCODONE    oxyCODONE    Medication Infusions         Vitals:    Temp:  [97 8 °F (36 6 °C)-101 6 °F (38 7 °C)] 101 6 °F (38 7 °C)  HR:  [58-85] 85  Resp:  [16-20] 20  BP: (115-129)/(65-74) 115/70  Wt Readings from Last 1 Encounters:   10/15/18 82 6 kg (182 lb)     Ht Readings from Last 1 Encounters:   10/15/18 6' 2" (1 88 m)     Body mass index is 23 37 kg/m²  Respiratory    Lab Data (Last 4 hours)    None         O2/Vent Data (Last 4 hours)    None              Patient Lines/Drains/Airways Status    Active Airway     None              I/O:  Current Diet Order:        Diet Orders            Start     Ordered    10/15/18 0205  Diet NPO; Sips with meds  Diet effective now     Question Answer Comment   Diet Type NPO    NPO Except: Sips with meds    RD to adjust diet per protocol? No        10/15/18 0204           Intake/Output Summary (Last 24 hours) at 10/15/18 1346  Last data filed at 10/15/18 0855   Gross per 24 hour   Intake                0 ml   Output                0 ml   Net                0 ml       Labs:    Results from last 7 days  Lab Units 10/15/18  0430 10/14/18  2059   WBC Thousand/uL 9 20 7 44   HEMOGLOBIN g/dL 12 8 13 9   HEMATOCRIT % 39 1 42 9   PLATELETS Thousands/uL 290 323       Results from last 7 days  Lab Units 10/15/18  0430 10/14/18  2059   SODIUM mmol/L 136 137   POTASSIUM mmol/L 3 9 3 9   CHLORIDE mmol/L 106 103   CO2 mmol/L 26 27   BUN mg/dL 13 17   CREATININE mg/dL 1 18 1 19   CALCIUM mg/dL 8 6 9 2             Pain Management Panel     There is no flowsheet data to display            Imaging:   CT: 10/14/18: fracture of the L mandible angle, with cellulitis/ small abscess formation, involving tooth #17    P/E:   General: NAD  Neuro Exam: AAO x 3  Head: Normocephalic  Face: moderate swelling and tenderness to L mandible angle region  Ears: no tenderness to plapation, hearing grossly intact  Eyes/Periorbital: PERRLA/ EOMI B/L, no periorbital swelling  Nose: External nose symmetrical/no gross deformity, no nasal crepitus,   Oral Exam: stable, non reproducible occlusion  Swelling to lower left vestibule  Dentalalveolar Exam: no dental fractures noted    Lymph/Neck Exam: Neck is soft, trachea is midline, no gross cervical lymphadenopathy bilaterally, Musculoskeletal: Neck with FROM  Cardiovascular: regular rate and rhythm,  Respiratory: clear to auscultation,    Extremities: FROM      Fabiano Bryson, DMD

## 2018-10-15 NOTE — UTILIZATION REVIEW
Initial Clinical Review    Admission: Date/Time/Statement: 10/15/18 @ 0200  TRANSFER FROM [facility] TO [facility] FOR OMF CONSULT     Orders Placed This Encounter   Procedures    Place in Observation     Standing Status:   Standing     Number of Occurrences:   1     Order Specific Question:   Admitting Physician     Answer:   Lalit Dee     Order Specific Question:   Level of Care     Answer:   Med Surg [16]     ED: Date/Time/Mode of Arrival:   ED Arrival Information     Expected Arrival Acuity Means of Arrival Escorted By Service Admission Type    - 10/15/2018 01:34 Immediate Ambulance [facility] Trauma Urgent    Arrival Complaint    Fracture        Chief Complaint:   Chief Complaint   Patient presents with    Jaw Swelling     Patient reports that he broke his jaw in September in a boxing match  Patient reports he took a nap and when he woke up he had new left sided facial swelling  Transferred from [facility] for further evaluation of mandibular fx  History of Illness:  Joe Berry is a 20 y o  male who presents with left jaw pain  Patient states that his symptoms began acutely upon awakening today at 5:00 p m  He states that he had a fracture that was diagnosed on 09/25  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  He had signed out against medical advice, but was given amoxicillin and Tylenol Motrin for pain control  He was doing well, until yesterday when he noticed that when he woke up from his nap, he had swelling in the left side of his face, was unable to open his mouth  He denies any fevers chills or difficulty breathing  He denies any difficulty tolerating his secretions  He endorses some difficulty opening his mouth beyond 1 cm  Otherwise on ROS, denying any other symptoms    Mechanism: Boxing    ED Vital Signs:   ED Triage Vitals   Temperature Pulse Respirations Blood Pressure SpO2   10/15/18 0130 10/15/18 0130 10/15/18 0130 10/15/18 0130 10/15/18 0130   97 8 °F (36 6 °C) 63 16 128/65 100 %      Temp Source Heart Rate Source Patient Position - Orthostatic VS BP Location FiO2 (%)   10/15/18 0130 10/15/18 0130 10/15/18 0130 10/15/18 0300 --   Tympanic Monitor Sitting Right arm       Pain Score       10/15/18 0130       6        Wt Readings from Last 1 Encounters:   10/15/18 82 6 kg (182 lb)     Abnormal Labs    Blood cultures pending     Diagnostic Test Results:     10/14 CT facial bones -  Small fluid collection involving the region of the angle of the left mandible  Moderate overlying soft tissue swelling  Nondisplaced fracture of the left mandibular ramus and angle of the mandible      Past Medical/Surgical History: Active Ambulatory Problems     Diagnosis Date Noted    Tooth impaction 09/25/2018    Closed fracture of angle of mandible, initial encounter ([facility] ) 09/25/2018    Closed fracture of tooth 09/25/2018    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     Resolved Ambulatory Problems     Diagnosis Date Noted    No Resolved Ambulatory Problems     No Additional Past Medical History     Admitting Diagnosis: Tooth impaction [K01 1]  Jaw swelling [R22 0]  Closed fracture of angle of mandible, initial encounter ([facility] ) [S02 650A]    Age/Sex: 20 y o  male    Assessment/Plan:   Trauma Active Problems:   Nondisplaced fracture of the left angle of the mandible with 3rd molar impaction      Trauma Plan:   Nondisplaced fracture of the left angle mandible with 3rd molar impaction  -OMFS consulted, patient to go to OR 10/15 for tooth extraction and ORIF of mandible   -pain control  -NPO  -ancef     Dispo:   Admit     Chief Complaint:  Left jaw pain    Admission Orders:  Scheduled Meds:   Current Facility-Administered Medications:  acetaminophen 650 mg Oral Q4H PRN    cefazolin 2,000 mg Intravenous Q8H Last Rate: 2,000 mg (10/15/18 0246)   morphine injection 2 mg Intravenous Q1H PRN    oxyCODONE 5 mg Oral Q4H PRN      PRN Meds:   Acetaminophen x1    morphine injection x2    oxyCODONE    Cons OMF Surgery   Diet NPO w/ sips   _____________________  10/15 OMF SURGERY CONSULT   20 y o  male with non repaired Left mandible fracture involving tooth #17, with acute cellulitis     Plan/Recs:  Consent for ORIF of Left mandible fracture and extraction of tooth #17 obtained  Keep NPO  Continue IV abx  Continue pain management  To OR when called    Thank you,  [address] [place], [place] [address]simi  Utilization Review Department  Phone: [phone]; Fax [phone]  ATTENTION: Please call with any questions or concerns to [phone]  and carefully follow the prompts so that you are directed to the right person  Send all requests for admission clinical reviews, approved or denied determinations and any other requests to fax [phone]   All voicemails are confidential

## 2018-10-15 NOTE — ANESTHESIA PREPROCEDURE EVALUATION
Review of Systems/Medical History  Patient summary reviewed  Chart reviewed      Cardiovascular  Exercise tolerance (METS): >4,     Pulmonary  Not a smoker ,        GI/Hepatic            Endo/Other     GYN       Hematology   Musculoskeletal       Neurology   Psychology                Anesthesia Plan  ASA Score- 1     Anesthesia Type- general with ASA Monitors  Additional Monitors:   Airway Plan: NTT  Plan Factors-    Induction- intravenous  Postoperative Plan- Plan for postoperative opioid use  Planned trial extubation    Informed Consent- Anesthetic plan and risks discussed with patient  I personally reviewed this patient with the CRNA  Discussed and agreed on the Anesthesia Plan with the CRNA  Lizzette Reyes

## 2018-10-15 NOTE — PROGRESS NOTES
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   Assessment & Plan    -OMFS consulted, appreciate input  -antibiotics started will continue  -requires operative intervention  -continue to monitor  -no issues with breathing or swallowing at this time  -swelling has increased however since last seen in the hospital on previous admission  -patient agreeable to surgery  -continue p r n  pain management       DVT prophylaxis: Will start SQH in post-op  PT and OT: eval and treat    Disposition:  Patient to go to the OR today with OMFS  Follow-up in postop  Mechanism of Injury: Other: came back for re-evaluation of facial swelling    Transfer from: [facility]  Outside Films Received: yes  Tertiary Exam Due on: 10/15/18    Vitals: Blood pressure 115/70, pulse 85, temperature (!) 101 6 °F (38 7 °C), temperature source Oral, resp  rate 20, height 6' 2" (1 88 m), weight 82 6 kg (182 lb), SpO2 97 %  ,Body mass index is 23 37 kg/m²  CT / RADIOGRAPHS: ALL RESULTS MUST BE CONFIRMED BY FACULTY OR PRINTED REPORT    No results found      Consultants - List Service/ Faculty and Date: OMFS    Active medications:           Current Facility-Administered Medications:     acetaminophen (TYLENOL) tablet 975 mg, 975 mg, Oral, Q8H [facility]    ceFAZolin (ANCEF) IVPB (premix) 2,000 mg, 2,000 mg, Intravenous, Q8H, 2,000 mg at 10/15/18 1114    chlorhexidine (PERIDEX) 0 12 % oral rinse 225 mL, 225 mL, Swish & Spit, Once    HYDROmorphone (DILAUDID) injection 0 5 mg, 0 5 mg, Intravenous, Q4H PRN    methocarbamol (ROBAXIN) tablet 500 mg, 500 mg, Oral, Q6H CHRISSY, 500 mg at 10/15/18 1118    oxyCODONE (ROXICODONE) immediate release tablet 10 mg, 10 mg, Oral, Q4H PRN    oxyCODONE (ROXICODONE) IR tablet 5 mg, 5 mg, Oral, Q4H PRN, 5 mg at 10/15/18 1120      Intake/Output Summary (Last 24 hours) at 10/15/18 1509  Last data filed at 10/15/18 1359   Gross per 24 hour   Intake                0 ml   Output              575 ml   Net             -575 ml       Invasive Devices     Peripheral Intravenous Line            Peripheral IV 10/14/18 Right Antecubital less than 1 day                Is the patient 65 years or older: No    1  GCS:  GCS Total:  15  2  Head:   Facial swelling over the left mandible  No surrounding erythema  No ecchymosis  Dentition appears to be intact  PERRLA  3  Neck:    Swelling of the neck on the left side  No stridor appreciated on exam   No carotid bruits  4  Chest:   WNL  5  Abdomen/Pelvis:   WNL  6  Back (log roll with spinal immobilization unless cleared radiographically): WNL  7  Extremities:   Lacs, abrasions, swelling, ecchymosis:  Nontender on extremities, neurovascularly intact   Tenderness, pain with motor, instability:  +2 pulses on extremities  8  Peripheral Nerves: WNL    Do NOT use the following abbreviations: DTO, gr, Shaka, MS, MSO4, MgSO4, Nitro, QD, QID, QOD, u, , ?, ?g or trailing zeros   Always use a zero before a decimal     Labs:   CBC:   Lab Results   Component Value Date    WBC 9 20 10/15/2018    HGB 12 8 10/15/2018    HCT 39 1 10/15/2018    MCV 88 10/15/2018     10/15/2018    MCH 28 9 10/15/2018    MCHC 32 7 10/15/2018    RDW 13 4 10/15/2018    MPV 10 0 10/15/2018    NRBC 0 10/14/2018     CMP:   Lab Results   Component Value Date     10/15/2018     10/15/2018    CO2 26 10/15/2018    BUN 13 10/15/2018    CREATININE 1 18 10/15/2018    CALCIUM 8 6 10/15/2018    AST 25 10/14/2018    ALT 33 10/14/2018    ALKPHOS 83 10/14/2018 EGFR 99 10/15/2018     Nurse Provider Rounds completed, plan of care discussed

## 2018-10-15 NOTE — PLAN OF CARE
Problem: DISCHARGE PLANNING - CARE MANAGEMENT  Goal: Discharge to post-acute care or home with appropriate resources  INTERVENTIONS:  - Conduct assessment to determine patient/family and health care team treatment goals, and need for post-acute services based on payer coverage, community resources, and patient preferences, and barriers to discharge  - Address psychosocial, clinical, and financial barriers to discharge as identified in assessment in conjunction with the patient/family and health care team  - Arrange appropriate level of post-acute services according to patient's   needs and preference and payer coverage in collaboration with the physician and health care team  - Communicate with and update the patient/family, physician, and health care team regarding progress on the discharge plan  - Arrange appropriate transportation to post-acute venues  When medically clear will d/c home   Outcome: Progressing

## 2018-10-16 ENCOUNTER — ANESTHESIA (OUTPATIENT)
Dept: PERIOP | Facility: HOSPITAL | Age: 23
End: 2018-10-16

## 2018-10-16 ENCOUNTER — ANESTHESIA EVENT (OUTPATIENT)
Dept: PERIOP | Facility: HOSPITAL | Age: 23
End: 2018-10-16

## 2018-10-16 LAB
ANION GAP SERPL CALCULATED.3IONS-SCNC: 5 MMOL/L (ref 4–13)
APTT PPP: 31 SECONDS (ref 24–36)
BASOPHILS # BLD AUTO: 0.04 THOUSANDS/ΜL (ref 0–0.1)
BASOPHILS NFR BLD AUTO: 0 % (ref 0–1)
BUN SERPL-MCNC: 13 MG/DL (ref 5–25)
CALCIUM SERPL-MCNC: 8.6 MG/DL (ref 8.3–10.1)
CHLORIDE SERPL-SCNC: 103 MMOL/L (ref 100–108)
CO2 SERPL-SCNC: 28 MMOL/L (ref 21–32)
CREAT SERPL-MCNC: 1.29 MG/DL (ref 0.6–1.3)
EOSINOPHIL # BLD AUTO: 0.18 THOUSAND/ΜL (ref 0–0.61)
EOSINOPHIL NFR BLD AUTO: 2 % (ref 0–6)
ERYTHROCYTE [DISTWIDTH] IN BLOOD BY AUTOMATED COUNT: 13.2 % (ref 11.6–15.1)
GFR SERPL CREATININE-BSD FRML MDRD: 88 ML/MIN/1.73SQ M
GLUCOSE SERPL-MCNC: 89 MG/DL (ref 65–140)
HCT VFR BLD AUTO: 38.2 % (ref 36.5–49.3)
HGB BLD-MCNC: 12.5 G/DL (ref 12–17)
IMM GRANULOCYTES # BLD AUTO: 0.02 THOUSAND/UL (ref 0–0.2)
IMM GRANULOCYTES NFR BLD AUTO: 0 % (ref 0–2)
INR PPP: 1.2 (ref 0.86–1.17)
LYMPHOCYTES # BLD AUTO: 1.88 THOUSANDS/ΜL (ref 0.6–4.47)
LYMPHOCYTES NFR BLD AUTO: 21 % (ref 14–44)
MCH RBC QN AUTO: 28.7 PG (ref 26.8–34.3)
MCHC RBC AUTO-ENTMCNC: 32.7 G/DL (ref 31.4–37.4)
MCV RBC AUTO: 88 FL (ref 82–98)
MONOCYTES # BLD AUTO: 0.78 THOUSAND/ΜL (ref 0.17–1.22)
MONOCYTES NFR BLD AUTO: 9 % (ref 4–12)
NEUTROPHILS # BLD AUTO: 6.01 THOUSANDS/ΜL (ref 1.85–7.62)
NEUTS SEG NFR BLD AUTO: 68 % (ref 43–75)
NRBC BLD AUTO-RTO: 0 /100 WBCS
PLATELET # BLD AUTO: 252 THOUSANDS/UL (ref 149–390)
PMV BLD AUTO: 9.6 FL (ref 8.9–12.7)
POTASSIUM SERPL-SCNC: 4 MMOL/L (ref 3.5–5.3)
PROTHROMBIN TIME: 15.3 SECONDS (ref 11.8–14.2)
RBC # BLD AUTO: 4.35 MILLION/UL (ref 3.88–5.62)
SODIUM SERPL-SCNC: 136 MMOL/L (ref 136–145)
WBC # BLD AUTO: 8.91 THOUSAND/UL (ref 4.31–10.16)

## 2018-10-16 PROCEDURE — 85730 THROMBOPLASTIN TIME PARTIAL: CPT | Performed by: EMERGENCY MEDICINE

## 2018-10-16 PROCEDURE — 85025 COMPLETE CBC W/AUTO DIFF WBC: CPT | Performed by: PHYSICIAN ASSISTANT

## 2018-10-16 PROCEDURE — 85610 PROTHROMBIN TIME: CPT | Performed by: EMERGENCY MEDICINE

## 2018-10-16 PROCEDURE — 99225 PR SBSQ OBSERVATION CARE/DAY 25 MINUTES: CPT | Performed by: SURGERY

## 2018-10-16 PROCEDURE — 80048 BASIC METABOLIC PNL TOTAL CA: CPT | Performed by: PHYSICIAN ASSISTANT

## 2018-10-16 RX ADMIN — OXYCODONE HYDROCHLORIDE 10 MG: 10 TABLET ORAL at 06:38

## 2018-10-16 RX ADMIN — METHOCARBAMOL 500 MG: 500 TABLET ORAL at 06:35

## 2018-10-16 RX ADMIN — HYDROMORPHONE HYDROCHLORIDE 0.5 MG: 1 INJECTION, SOLUTION INTRAMUSCULAR; INTRAVENOUS; SUBCUTANEOUS at 08:41

## 2018-10-16 RX ADMIN — CEFAZOLIN SODIUM 2000 MG: 2 SOLUTION INTRAVENOUS at 02:39

## 2018-10-16 RX ADMIN — CEFAZOLIN SODIUM 2000 MG: 2 SOLUTION INTRAVENOUS at 17:58

## 2018-10-16 RX ADMIN — OXYCODONE HYDROCHLORIDE 10 MG: 10 TABLET ORAL at 22:39

## 2018-10-16 RX ADMIN — ACETAMINOPHEN 975 MG: 325 TABLET, FILM COATED ORAL at 06:35

## 2018-10-16 RX ADMIN — OXYCODONE HYDROCHLORIDE 10 MG: 10 TABLET ORAL at 11:44

## 2018-10-16 RX ADMIN — ACETAMINOPHEN 975 MG: 325 TABLET, FILM COATED ORAL at 14:48

## 2018-10-16 RX ADMIN — CEFAZOLIN SODIUM 2000 MG: 2 SOLUTION INTRAVENOUS at 11:00

## 2018-10-16 RX ADMIN — METHOCARBAMOL 500 MG: 500 TABLET ORAL at 17:57

## 2018-10-16 RX ADMIN — ACETAMINOPHEN 975 MG: 325 TABLET, FILM COATED ORAL at 22:26

## 2018-10-16 RX ADMIN — METHOCARBAMOL 500 MG: 500 TABLET ORAL at 11:00

## 2018-10-16 NOTE — UTILIZATION REVIEW
Continued Stay Review    Date: 10/16/18    OBSERVATION 10/15/18 @ 0200    Vital Signs: /58 (BP Location: Left arm)   Pulse 62   Temp 99 °F (37 2 °C) (Oral)   Resp 18   Ht 6' 2" (1 88 m)   Wt 82 6 kg (182 lb)   SpO2 99%   BMI 23 37 kg/m²     Medications:   Scheduled Meds:   Current Facility-Administered Medications:  acetaminophen 975 mg Oral Q8H CHRISSY    cefazolin 2,000 mg Intravenous Q8H Last Rate: 2,000 mg (10/16/18 0239)   heparin (porcine) 5,000 Units Subcutaneous Q8H [address]    HYDROmorphone 0 5 mg Intravenous Q4H PRN    methocarbamol 500 mg Oral Q6H CHRISSY    oxyCODONE 10 mg Oral Q4H PRN    oxyCODONE 5 mg Oral Q4H PRN      PRN Meds: HYDROmorphone X1 10/16    oxyCODONE 10 MG X 2 IN LAST 24 HRS     oxyCODONE 5 MG     Abnormal Labs/Diagnostic Results:     PT/INR 15 3/1 20    Age/Sex: 20 y o  male     Assessment/Plan: OR could not schedule case 10/15 - looking for OR time today  Discharge Plan: home       Thank you,  [facility]  Utilization Review Department  Phone: [phone]; Fax [phone]  ATTENTION: Please call with any questions or concerns to [phone]  and carefully follow the prompts so that you are directed to the right person  Send all requests for admission clinical reviews, approved or denied determinations and any other requests to fax [phone]   All voicemails are confidential

## 2018-10-16 NOTE — OCCUPATIONAL THERAPY NOTE
Occupational Therapy Screen Note    Orders received and chart reviewed  Per medical team, Pt seen Independently ambulating in hospital hallways and performing ADLS w/o A  OT and Pt discussed current memory/cognitive status with no concerns for Pt to return home w/o any needs upon D/C when medically cleared/stable  No skilled acute care OT services needed at this time  OT recommends Pt safe to D/C home when medically cleared       ROBBY Nelson, OTR/L

## 2018-10-17 ENCOUNTER — ANESTHESIA (OUTPATIENT)
Dept: PERIOP | Facility: HOSPITAL | Age: 23
End: 2018-10-17

## 2018-10-17 ENCOUNTER — ANESTHESIA EVENT (OUTPATIENT)
Dept: PERIOP | Facility: HOSPITAL | Age: 23
End: 2018-10-17

## 2018-10-17 PROCEDURE — C1713 ANCHOR/SCREW BN/BN,TIS/BN: HCPCS | Performed by: DENTIST

## 2018-10-17 PROCEDURE — 99225 PR SBSQ OBSERVATION CARE/DAY 25 MINUTES: CPT | Performed by: NURSE PRACTITIONER

## 2018-10-17 DEVICE — 2.0MM IMF SCREW SELF-DRILLING 8MM: Type: IMPLANTABLE DEVICE | Site: MOUTH | Status: FUNCTIONAL

## 2018-10-17 DEVICE — 2.0MM TI MATRIXMANDIBLE SCREW SELF-TAPPING 8MM
Type: IMPLANTABLE DEVICE | Site: MOUTH | Status: FUNCTIONAL
Brand: MATRIXMANDIBLE

## 2018-10-17 DEVICE — MATRIXMANDIBLE PRE-BNT TENSION BAND PLATE/LEFT/2X2H/1.0MM TH
Type: IMPLANTABLE DEVICE | Site: MOUTH | Status: FUNCTIONAL
Brand: MATRIXMANDIBLE

## 2018-10-17 DEVICE — 2.0MM TI MATRIXMANDIBLE SCREW SELF-TAPPING 6MM
Type: IMPLANTABLE DEVICE | Site: MOUTH | Status: FUNCTIONAL
Brand: MATRIXMANDIBLE

## 2018-10-17 DEVICE — 0.5MM PRECUT CERCLAGE WIRE 175MM: Type: IMPLANTABLE DEVICE | Site: MOUTH | Status: FUNCTIONAL

## 2018-10-17 RX ORDER — LIDOCAINE HYDROCHLORIDE 10 MG/ML
INJECTION, SOLUTION INFILTRATION; PERINEURAL AS NEEDED
Status: DISCONTINUED | OUTPATIENT
Start: 2018-10-17 | End: 2018-10-17 | Stop reason: SURG

## 2018-10-17 RX ORDER — CHLORHEXIDINE GLUCONATE 0.12 MG/ML
RINSE ORAL AS NEEDED
Status: DISCONTINUED | OUTPATIENT
Start: 2018-10-17 | End: 2018-10-17 | Stop reason: HOSPADM

## 2018-10-17 RX ORDER — PROPOFOL 10 MG/ML
INJECTION, EMULSION INTRAVENOUS AS NEEDED
Status: DISCONTINUED | OUTPATIENT
Start: 2018-10-17 | End: 2018-10-17 | Stop reason: SURG

## 2018-10-17 RX ORDER — FENTANYL CITRATE/PF 50 MCG/ML
25 SYRINGE (ML) INJECTION
Status: COMPLETED | OUTPATIENT
Start: 2018-10-17 | End: 2018-10-17

## 2018-10-17 RX ORDER — MIDAZOLAM HYDROCHLORIDE 1 MG/ML
INJECTION INTRAMUSCULAR; INTRAVENOUS AS NEEDED
Status: DISCONTINUED | OUTPATIENT
Start: 2018-10-17 | End: 2018-10-17 | Stop reason: SURG

## 2018-10-17 RX ORDER — GLYCOPYRROLATE 0.2 MG/ML
INJECTION INTRAMUSCULAR; INTRAVENOUS AS NEEDED
Status: DISCONTINUED | OUTPATIENT
Start: 2018-10-17 | End: 2018-10-17 | Stop reason: SURG

## 2018-10-17 RX ORDER — MAGNESIUM HYDROXIDE 1200 MG/15ML
LIQUID ORAL AS NEEDED
Status: DISCONTINUED | OUTPATIENT
Start: 2018-10-17 | End: 2018-10-17 | Stop reason: HOSPADM

## 2018-10-17 RX ORDER — METOCLOPRAMIDE HYDROCHLORIDE 5 MG/ML
10 INJECTION INTRAMUSCULAR; INTRAVENOUS ONCE AS NEEDED
Status: DISCONTINUED | OUTPATIENT
Start: 2018-10-17 | End: 2018-10-17 | Stop reason: HOSPADM

## 2018-10-17 RX ORDER — BUPIVACAINE HYDROCHLORIDE AND EPINEPHRINE 5; 5 MG/ML; UG/ML
INJECTION, SOLUTION PERINEURAL AS NEEDED
Status: DISCONTINUED | OUTPATIENT
Start: 2018-10-17 | End: 2018-10-17 | Stop reason: HOSPADM

## 2018-10-17 RX ORDER — FENTANYL CITRATE 50 UG/ML
INJECTION, SOLUTION INTRAMUSCULAR; INTRAVENOUS AS NEEDED
Status: DISCONTINUED | OUTPATIENT
Start: 2018-10-17 | End: 2018-10-17 | Stop reason: SURG

## 2018-10-17 RX ORDER — SUCCINYLCHOLINE CHLORIDE 20 MG/ML
INJECTION INTRAMUSCULAR; INTRAVENOUS AS NEEDED
Status: DISCONTINUED | OUTPATIENT
Start: 2018-10-17 | End: 2018-10-17 | Stop reason: SURG

## 2018-10-17 RX ORDER — SODIUM CHLORIDE 9 MG/ML
50 INJECTION, SOLUTION INTRAVENOUS CONTINUOUS
Status: DISCONTINUED | OUTPATIENT
Start: 2018-10-17 | End: 2018-10-18

## 2018-10-17 RX ORDER — ROCURONIUM BROMIDE 10 MG/ML
INJECTION, SOLUTION INTRAVENOUS AS NEEDED
Status: DISCONTINUED | OUTPATIENT
Start: 2018-10-17 | End: 2018-10-17 | Stop reason: SURG

## 2018-10-17 RX ORDER — HYDROMORPHONE HCL/PF 1 MG/ML
0.2 SYRINGE (ML) INJECTION
Status: COMPLETED | OUTPATIENT
Start: 2018-10-17 | End: 2018-10-17

## 2018-10-17 RX ORDER — CLINDAMYCIN PHOSPHATE 150 MG/ML
INJECTION, SOLUTION INTRAVENOUS AS NEEDED
Status: DISCONTINUED | OUTPATIENT
Start: 2018-10-17 | End: 2018-10-17 | Stop reason: SURG

## 2018-10-17 RX ORDER — LIDOCAINE HYDROCHLORIDE AND EPINEPHRINE 10; 10 MG/ML; UG/ML
INJECTION, SOLUTION INFILTRATION; PERINEURAL AS NEEDED
Status: DISCONTINUED | OUTPATIENT
Start: 2018-10-17 | End: 2018-10-17 | Stop reason: HOSPADM

## 2018-10-17 RX ORDER — ONDANSETRON 2 MG/ML
INJECTION INTRAMUSCULAR; INTRAVENOUS AS NEEDED
Status: DISCONTINUED | OUTPATIENT
Start: 2018-10-17 | End: 2018-10-17 | Stop reason: SURG

## 2018-10-17 RX ORDER — ONDANSETRON 2 MG/ML
4 INJECTION INTRAMUSCULAR; INTRAVENOUS ONCE AS NEEDED
Status: DISCONTINUED | OUTPATIENT
Start: 2018-10-17 | End: 2018-10-17 | Stop reason: HOSPADM

## 2018-10-17 RX ADMIN — SODIUM CHLORIDE 50 ML/HR: 0.9 INJECTION, SOLUTION INTRAVENOUS at 10:38

## 2018-10-17 RX ADMIN — HYDROMORPHONE HYDROCHLORIDE 0.5 MG: 1 INJECTION, SOLUTION INTRAMUSCULAR; INTRAVENOUS; SUBCUTANEOUS at 17:35

## 2018-10-17 RX ADMIN — ACETAMINOPHEN 975 MG: 325 TABLET, FILM COATED ORAL at 22:04

## 2018-10-17 RX ADMIN — OXYCODONE HYDROCHLORIDE 10 MG: 10 TABLET ORAL at 06:23

## 2018-10-17 RX ADMIN — MIDAZOLAM 2 MG: 1 INJECTION INTRAMUSCULAR; INTRAVENOUS at 13:53

## 2018-10-17 RX ADMIN — CLINDAMYCIN PHOSPHATE 900 MG: 150 INJECTION, SOLUTION INTRAMUSCULAR; INTRAVENOUS at 13:58

## 2018-10-17 RX ADMIN — CEFAZOLIN SODIUM 2000 MG: 2 SOLUTION INTRAVENOUS at 01:59

## 2018-10-17 RX ADMIN — FENTANYL CITRATE 25 MCG: 50 INJECTION, SOLUTION INTRAMUSCULAR; INTRAVENOUS at 16:05

## 2018-10-17 RX ADMIN — ONDANSETRON 4 MG: 2 INJECTION INTRAMUSCULAR; INTRAVENOUS at 14:17

## 2018-10-17 RX ADMIN — FENTANYL CITRATE 25 MCG: 50 INJECTION, SOLUTION INTRAMUSCULAR; INTRAVENOUS at 15:50

## 2018-10-17 RX ADMIN — HYDROMORPHONE HYDROCHLORIDE 0.5 MG: 1 INJECTION, SOLUTION INTRAMUSCULAR; INTRAVENOUS; SUBCUTANEOUS at 22:07

## 2018-10-17 RX ADMIN — ACETAMINOPHEN 975 MG: 325 TABLET, FILM COATED ORAL at 06:23

## 2018-10-17 RX ADMIN — METHOCARBAMOL 500 MG: 500 TABLET ORAL at 00:04

## 2018-10-17 RX ADMIN — OXYCODONE HYDROCHLORIDE 10 MG: 10 TABLET ORAL at 18:49

## 2018-10-17 RX ADMIN — FENTANYL CITRATE 50 MCG: 50 INJECTION, SOLUTION INTRAMUSCULAR; INTRAVENOUS at 14:07

## 2018-10-17 RX ADMIN — PROPOFOL 200 MG: 10 INJECTION, EMULSION INTRAVENOUS at 14:07

## 2018-10-17 RX ADMIN — CEFAZOLIN SODIUM 2000 MG: 2 SOLUTION INTRAVENOUS at 10:40

## 2018-10-17 RX ADMIN — HYDROMORPHONE HYDROCHLORIDE 0.2 MG: 1 INJECTION, SOLUTION INTRAMUSCULAR; INTRAVENOUS; SUBCUTANEOUS at 16:15

## 2018-10-17 RX ADMIN — PHENYLEPHRINE HYDROCHLORIDE 3 DROP: 1 SPRAY NASAL at 13:51

## 2018-10-17 RX ADMIN — OXYCODONE HYDROCHLORIDE 10 MG: 10 TABLET ORAL at 11:51

## 2018-10-17 RX ADMIN — FENTANYL CITRATE 100 MCG: 50 INJECTION, SOLUTION INTRAMUSCULAR; INTRAVENOUS at 14:16

## 2018-10-17 RX ADMIN — HYDROMORPHONE HYDROCHLORIDE 0.2 MG: 1 INJECTION, SOLUTION INTRAMUSCULAR; INTRAVENOUS; SUBCUTANEOUS at 16:20

## 2018-10-17 RX ADMIN — NEOSTIGMINE METHYLSULFATE 3 MG: 1 INJECTION, SOLUTION INTRAMUSCULAR; INTRAVENOUS; SUBCUTANEOUS at 14:48

## 2018-10-17 RX ADMIN — HYDROMORPHONE HYDROCHLORIDE 0.2 MG: 1 INJECTION, SOLUTION INTRAMUSCULAR; INTRAVENOUS; SUBCUTANEOUS at 16:25

## 2018-10-17 RX ADMIN — METHOCARBAMOL 500 MG: 500 TABLET ORAL at 17:30

## 2018-10-17 RX ADMIN — METHOCARBAMOL 500 MG: 500 TABLET ORAL at 11:51

## 2018-10-17 RX ADMIN — CEFAZOLIN SODIUM 2000 MG: 2 SOLUTION INTRAVENOUS at 17:30

## 2018-10-17 RX ADMIN — LIDOCAINE HYDROCHLORIDE 50 MG: 10 INJECTION, SOLUTION INFILTRATION; PERINEURAL at 14:07

## 2018-10-17 RX ADMIN — FENTANYL CITRATE 25 MCG: 50 INJECTION, SOLUTION INTRAMUSCULAR; INTRAVENOUS at 15:40

## 2018-10-17 RX ADMIN — GLYCOPYRROLATE 0.6 MG: 0.2 INJECTION, SOLUTION INTRAMUSCULAR; INTRAVENOUS at 14:48

## 2018-10-17 RX ADMIN — FENTANYL CITRATE 50 MCG: 50 INJECTION, SOLUTION INTRAMUSCULAR; INTRAVENOUS at 13:51

## 2018-10-17 RX ADMIN — HYDROMORPHONE HYDROCHLORIDE 0.2 MG: 1 INJECTION, SOLUTION INTRAMUSCULAR; INTRAVENOUS; SUBCUTANEOUS at 16:10

## 2018-10-17 RX ADMIN — HYDROMORPHONE HYDROCHLORIDE 0.2 MG: 1 INJECTION, SOLUTION INTRAMUSCULAR; INTRAVENOUS; SUBCUTANEOUS at 16:34

## 2018-10-17 RX ADMIN — DEXAMETHASONE SODIUM PHOSPHATE 10 MG: 10 INJECTION INTRAMUSCULAR; INTRAVENOUS at 14:17

## 2018-10-17 RX ADMIN — METHOCARBAMOL 500 MG: 500 TABLET ORAL at 06:23

## 2018-10-17 RX ADMIN — SUCCINYLCHOLINE CHLORIDE 100 MG: 20 INJECTION, SOLUTION INTRAMUSCULAR; INTRAVENOUS at 14:07

## 2018-10-17 RX ADMIN — SODIUM CHLORIDE 50 ML/HR: 0.9 INJECTION, SOLUTION INTRAVENOUS at 16:10

## 2018-10-17 RX ADMIN — ROCURONIUM BROMIDE 20 MG: 10 INJECTION INTRAVENOUS at 14:16

## 2018-10-17 RX ADMIN — GLYCOPYRROLATE 0.2 MG: 0.2 INJECTION, SOLUTION INTRAMUSCULAR; INTRAVENOUS at 13:53

## 2018-10-17 RX ADMIN — FENTANYL CITRATE 25 MCG: 50 INJECTION, SOLUTION INTRAMUSCULAR; INTRAVENOUS at 15:45

## 2018-10-17 NOTE — PROGRESS NOTES
Pt arrived from PACU, AA&Ox3  Resp easy, unlabored  Mouth wired shut, wire cutters at bedside  Pt oob, ambulating room  VSS  Will continue to monitor  IVF's infusing via left forearm without difficulty

## 2018-10-17 NOTE — OP NOTE
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, subsequent encounter [S02 652K]    Procedures:  1  ORIF left mandible fracture  2  Debridement associated with fracture left angle  3  Surgical extraction tooth 16  4  Partial bony impacted extraction tooth 17     Specimen(s):  * No specimens    Estimated Blood Loss:   50 mL    Drains:   No drains    Anesthesia Type:   General    Operative Indications:  Tooth impaction [K01 1]  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, subsequent encounter [S02 652K]      Operative Findings:  Partially consolidated left angle fracture    Complications:   None    Procedure and Technique:    The patient was greeted in the preoperative area  All the risks and benefits of the procedure were once again explained and the risks of malocclusion, nonunion, malunion, pain ,bleeding, infection, swelling, permanent nerve dysfunction including lower chin and lip numbness were explained in detail all questions were answered  I reviewed the details that as the patient signed out AMA 2 weeks ago that he potentially might have an extended period of intermaxillary fixation  I reviewed the need for compliance with the IMF as well as his liquid diet  Consent had already been signed  Care was then handed back to the anesthesia team     The patient was brought into the operating room by the anesthesia team and the patient was placed in a supine position where the patient remained for the rest of the case   Anesthesia was able to establish a nasotracheal intubation without any complications  Care was then handed back to the OMFS team     Patient was draped in sterile manner timeout was performed in which the patient was correctly identified by name medical record number as well as a site of the procedure be performed  Patient had received preoperative  mg clindamycin Antibiotics  Once a timeout was completed oral cavity was thoroughly suctioned with the KitchInkauer suction the moist vaginal packing was used it as a throat pack  Patient was given local anesthesia at the sites of the fracture and IMF screws with 1% lidocaine with 1-100,000 epinephrine as local anesthesia per operating room record  We were able to manipulate the mandible to obtain good occlusion and to get adequate reduction of the mandible segments  A 15 blade was used to make an incision in the sulcus of tooth 16  A new blade was then used to make incision along the left external oblique ridge and carried into the sulcus of tooth 17, 18, and 19  Rotary instrumentation was used to remove teeth 16  And 17 by sectioning the teeth  The mandibular flap was then further elevated exposing the fracture  We then placed the patient into intermaxillary fixation with 22-gauge interdental wires  Attention was then directed to the left mandibular angle  The fracture was visualized  The site of the fracture was grossly debrided using Rongeurs and curettes  Good approximation of the fracture segments was achieved  A Synthes prebent Champy plate was adapted to the left mandibular angle  This plate was secured with 6mm and 8mm monocortical screws x4  Good reduction of the fracture was obtained  Patient's occlusion was good with bilateral balanced contacts  The surgical site was thoroughly irrigated with sterile saline  The incision was closed with 3-0 chromic gut suture in a running fashion  Hemostasis was achieved  Patient was released from intermaxillary fixation   The oral cavity was thoroughly irrigated with sterile saline  Suctioned with the Yankauer suction, moist vaginal packing was then removed and the oropharynx was also suction thoroughly  All surgical sites were once again reevaluated and found to be hemostatic  Patient was placed back into intermaxillary fixation with 22-gauge interdental wires with good occlusion achieved  Care was then handed back to anesthesia team where the patient was extubated in the operating room without any complications and then transferred to the postanesthesia care unit       I was present for the entire procedure, A qualified resident physician was not available and A co-surgeon was required because of skills and techniques relevant to speciality    Patient Disposition:  hemodynamically stable and extubated and stable    SIGNATURE: Theresia Soulier, DMD  DATE: October 17, 2018  TIME: 2:59 PM

## 2018-10-17 NOTE — PROGRESS NOTES
Progress Note - Mayela Chawla 1995, 20 y o  male MRN: [id number]    Unit/Bed#: [facility] 818-01 Encounter: [id number]    Primary Care Provider: No primary care provider on file  Date and time admitted to hospital: 10/15/2018  1:34 AM        Jaw swelling   Assessment & Plan    -continue monitor  -no evidence of airway compromise  -no evidence of stridor  - slightly improved, able to communicate     Closed fracture of tooth   Assessment & Plan    OMFS following  Can follow up with his own dentist     Closed fracture of angle of mandible, initial encounter 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   Assessment & Plan    -OMFS consulted, appreciate input  -Continue abx  -requires operative intervention  -continue to monitor  -no issues with breathing or swallowing at this time  -patient agreeable to surgery  -continue p r n  pain management  -OR today with OMFS         Transfer from: home  Outside Films Received: no  Tertiary Exam Due on: 10/17/18    Vitals: Blood pressure 112/70, pulse 68, temperature 98 9 °F (37 2 °C), temperature source Oral, resp  rate 18, height 6' 2" (1 88 m), weight 82 6 kg (182 lb), SpO2 98 %  ,Body mass index is 23 37 kg/m²  CT / RADIOGRAPHS: ALL RESULTS MUST BE CONFIRMED BY FACULTY OR PRINTED REPORT    CT HEAD:  CT CHEST:    CT FACE: Small fluid collection involving the region of the angle of the left mandible  Moderate overlying soft tissue swelling       Nondisplaced fracture of the left mandibular ramus and angle of the mandible   CT ABDOMEN / PELVIS:   CT CERVICAL SPINE:  XR PELVIS:    CT THORACIC / LUMBAR SPINE:  CXR CHEST:    OTHER: OTHER:    OTHER:  OTHER:    OTHER: OTHER:    OTHER:  OTHER:    OTHER:  OTHER:      Consultants - List Service/ Faculty and Date: OMFS    Active medications:           Current Facility-Administered Medications:     acetaminophen (TYLENOL) tablet 975 mg, 975 mg, Oral, Q8H CHIRSSY, 975 mg at 10/17/18 0623    ceFAZolin (ANCEF) IVPB (premix) 2,000 mg, 2,000 mg, Intravenous, Q8H, 2,000 mg at 10/17/18 1040    heparin (porcine) subcutaneous injection 5,000 Units, 5,000 Units, Subcutaneous, Q8H [facility]    HYDROmorphone (DILAUDID) injection 0 5 mg, 0 5 mg, Intravenous, Q4H PRN, 0 5 mg at 10/16/18 0841    methocarbamol (ROBAXIN) tablet 500 mg, 500 mg, Oral, Q6H CHRISSY, 500 mg at 10/17/18 0915    oxyCODONE (ROXICODONE) immediate release tablet 10 mg, 10 mg, Oral, Q4H PRN, 10 mg at 10/17/18 3076    oxyCODONE (ROXICODONE) IR tablet 5 mg, 5 mg, Oral, Q4H PRN, 5 mg at 10/15/18 1120    sodium chloride 0 9 % infusion, 50 mL/hr, Intravenous, Continuous, 50 mL/hr at 10/17/18 1038      Intake/Output Summary (Last 24 hours) at 10/17/18 1053  Last data filed at 10/17/18 0347   Gross per 24 hour   Intake              140 ml   Output              650 ml   Net             -510 ml       Invasive Devices     Peripheral Intravenous Line            Peripheral IV 10/14/18 Right Antecubital 2 days                CAGE-AID Questionnaire:    Was the patient able to participate in the CAGE-AID screening questions on admission? No:   1  Does the patient consume alcohol? a  NO-Patient does not consume alcohol     2  Does the patient use street drugs or abuse prescription drugs? a  NO-Patient does not use street drugs or abuse prescription drugs    Did the patient answer YES in one of the questions above? No      Is the patient 65 years or older: No    1  GCS:  GCS Total:  15, Eye Opening:   Spontaneous = 4, Motor Response: Obeys commands = 6 and Verbal Response:  Oriented = 5  2  Head:       -  normocephalic  3  Neck:        -  Jaw swelling, no difficulty swallowing  4  Chest:        -  CTa bilaterally, no shortness of breath  5  Abdomen/Pelvis:        -  soft  6   Back (log roll with spinal immobilization unless cleared radiographically):       - Non-tender  7  Extremities:        -  Full range of motion of all four extremities  8  Peripheral Nerves:       - sensation intact    Do NOT use the following abbreviations: DTO, gr, Shaka, MS, MSO4, MgSO4, Nitro, QD, QID, QOD, u, , ?, ?g or trailing zeros  Always use a zero before a decimal     Labs: none        Nurse / Provider rounds completed with staff nurseMary Age and patient    Re-iterated that nothing by mouth until after surgery

## 2018-10-17 NOTE — ASSESSMENT & PLAN NOTE
-OMFS consulted, appreciate input  -Continue abx  -requires operative intervention  -continue to monitor  -no issues with breathing or swallowing at this time  -patient agreeable to surgery  -continue p r n  pain management  -OR today with OMFS

## 2018-10-17 NOTE — ANESTHESIA PREPROCEDURE EVALUATION
Review of Systems/Medical History  Patient summary reviewed  Chart reviewed  No history of anesthetic complications     Cardiovascular  Negative cardio ROS Exercise tolerance (METS): >4,     Pulmonary  Negative pulmonary ROS        GI/Hepatic  Negative GI/hepatic ROS          Negative  ROS        Endo/Other  Negative endo/other ROS      GYN       Hematology  Negative hematology ROS      Musculoskeletal  Negative musculoskeletal ROS        Neurology  Negative neurology ROS      Psychology   Negative psychology ROS            Physical Exam    Airway  Comment: Limited mouth opening    TM Distance: >3 FB  Neck ROM: full     Dental   Comment: Broken molars bilaterally - denies other dental damage,     Cardiovascular  Comment: Negative ROS,     Pulmonary      Other Findings      Lab Results   Component Value Date    WBC 8 91 10/16/2018    HGB 12 5 10/16/2018     10/16/2018     Lab Results   Component Value Date     10/16/2018    K 4 0 10/16/2018    BUN 13 10/16/2018    CREATININE 1 29 10/16/2018     Lab Results   Component Value Date    PTT 31 10/16/2018      Lab Results   Component Value Date    INR 1 20 (H) 10/16/2018     Blood type B+/antibody neg  Anesthesia Plan  ASA Score- 1     Anesthesia Type- general with ASA Monitors  Additional Monitors:   Airway Plan: NTT  Plan Factors-    Induction- intravenous  Postoperative Plan-     Informed Consent- Anesthetic plan and risks discussed with patient  I personally reviewed this patient with the CRNA  Discussed and agreed on the Anesthesia Plan with the CRNA  Satish Holloway

## 2018-10-17 NOTE — INTERVAL H&P NOTE
H&P reviewed  After examining the patient I find no changes in the patients condition since the H&P had been written    Aliyah Michael, DMD

## 2018-10-17 NOTE — PROGRESS NOTES
OMFS Progress Note    Today's surgery was canceled as patient revealed in holdings that he disregarded to NPO orders and had chicken soup and bread in the afternoon, brought by his girlfriend  Pt presents with diminished swelling and pain, to left jaw; he is overall stable, NAD  The case is rescheduled as add-on for tomorrow      Recc:  NPO at midnight  Continue IV abx  To OR when called tomorrow afternoon

## 2018-10-17 NOTE — PROGRESS NOTES
Post op check    Assessment and Plan: This is a 68-year-old male who presents back to his room after ORIF of his left mandibular fracture with OMFS  -   bedside  -  Analgesia  -  Thin liquid diet    Subjective:  Patient states that his is in some pain  Objective:    Vitals:    10/17/18 1600 10/17/18 1630 10/17/18 1720 10/17/18 1820   BP: 146/97 143/70 131/97 165/97   BP Location:   Left arm Left arm   Pulse: 76 (!) 52 64 63   Resp: 15 13 18 18   Temp:  98 7 °F (37 1 °C) 98 1 °F (36 7 °C) 98 7 °F (37 1 °C)   TempSrc:   Axillary Axillary   SpO2: 98% 97% 97% 96%   Weight:       Height:           Physical Exam   Constitutional: Vital signs are normal  He appears well-developed and well-nourished  He is cooperative  No distress  HENT:   Jaw wired shut   Eyes: Pupils are equal, round, and reactive to light  Conjunctivae, EOM and lids are normal    Neck: Trachea normal  No thyroid mass and no thyromegaly present  Cardiovascular: Normal rate, regular rhythm, normal heart sounds, intact distal pulses and normal pulses  No murmur heard  Pulmonary/Chest: Effort normal and breath sounds normal    Abdominal: Soft  Normal appearance and bowel sounds are normal  There is no tenderness  There is no rebound, no guarding, no CVA tenderness and negative Barros's sign  Neurological: He is alert  Skin: Skin is warm, dry and intact  Psychiatric: He has a normal mood and affect   His speech is normal and behavior is normal  Thought content normal

## 2018-10-17 NOTE — UTILIZATION REVIEW
Continued Stay Review    Date: 10/17/18   OBSERVATION 10/15 @ 0200    Vital Signs: /70 (BP Location: Left arm)   Pulse 68   Temp 98 9 °F (37 2 °C) (Oral)   Resp 18   Ht 6' 2" (1 88 m)   Wt 82 6 kg (182 lb)   SpO2 98%   BMI 23 37 kg/m²     Medications:   Scheduled Meds:   Current Facility-Administered Medications:  acetaminophen 975 mg Oral Q8H [address]    cefazolin 2,000 mg Intravenous Q8H Last Rate: 2,000 mg (10/17/18 1040)   heparin (porcine) 5,000 Units Subcutaneous Q8H [address]    HYDROmorphone 0 5 mg Intravenous Q4H PRN    methocarbamol 500 mg Oral Q6H [address]    oxyCODONE 10 mg Oral Q4H PRN    oxyCODONE 5 mg Oral Q4H PRN    sodium chloride 50 mL/hr Intravenous Continuous Last Rate: 50 mL/hr (10/17/18 1038)     Continuous Infusions:   sodium chloride 50 mL/hr Last Rate: 50 mL/hr (10/17/18 1038)     PRN Meds: HYDROmorphone    oxyCODONE 10 mg x2 in last 24 hrs    Age/Sex: 20 y o  male     Assessment/Plan: OR 10/16 cancelled because pt ate food  Discharge Plan: home     Thank you,  Eliseo Mccormick Utilization Review Department  Phone: [phone]; Fax [phone]  ATTENTION: Please call with any questions or concerns to [phone]  and carefully follow the prompts so that you are directed to the right person  Send all requests for admission clinical reviews, approved or denied determinations and any other requests to fax [phone]   All voicemails are confidential

## 2018-10-17 NOTE — ASSESSMENT & PLAN NOTE
-continue monitor  -no evidence of airway compromise  -no evidence of stridor  - slightly improved, able to communicate

## 2018-10-17 NOTE — ANESTHESIA POSTPROCEDURE EVALUATION
Post-Op Assessment Note      CV Status:  Stable    Mental Status:  Alert    Hydration Status:  Stable and euvolemic    PONV Controlled:  None    Airway Patency:  Patent  Airway: intubated    Post Op Vitals Reviewed: Yes          Staff: CRNA           /85 (10/17/18 1505)    Temp 98 1 °F (36 7 °C) (10/17/18 1505)    Pulse 80 (10/17/18 1505)   Resp 16 (10/17/18 1505)    SpO2   100

## 2018-10-18 ENCOUNTER — TELEPHONE (OUTPATIENT)
Dept: SURGERY | Facility: CLINIC | Age: 23
End: 2018-10-18

## 2018-10-18 VITALS
OXYGEN SATURATION: 98 % | HEART RATE: 54 BPM | BODY MASS INDEX: 23.36 KG/M2 | TEMPERATURE: 97.3 F | SYSTOLIC BLOOD PRESSURE: 122 MMHG | DIASTOLIC BLOOD PRESSURE: 55 MMHG | WEIGHT: 182 LBS | HEIGHT: 74 IN | RESPIRATION RATE: 18 BRPM

## 2018-10-18 DIAGNOSIS — S02.650A: ICD-10-CM

## 2018-10-18 PROCEDURE — 99217 PR OBSERVATION CARE DISCHARGE MANAGEMENT: CPT | Performed by: SURGERY

## 2018-10-18 RX ORDER — OXYCODONE HCL 5 MG/5 ML
5 SOLUTION, ORAL ORAL EVERY 6 HOURS PRN
Qty: 250 ML | Refills: 0 | Status: SHIPPED | OUTPATIENT
Start: 2018-10-18 | End: 2020-09-11

## 2018-10-18 RX ORDER — OXYCODONE HCL 5 MG/5 ML
10 SOLUTION, ORAL ORAL EVERY 4 HOURS PRN
Status: DISCONTINUED | OUTPATIENT
Start: 2018-10-18 | End: 2018-10-18 | Stop reason: HOSPADM

## 2018-10-18 RX ORDER — OXYCODONE HYDROCHLORIDE 5 MG/1
5 TABLET ORAL EVERY 6 HOURS PRN
Qty: 5 TABLET | Refills: 0 | Status: SHIPPED | OUTPATIENT
Start: 2018-10-18 | End: 2020-09-11

## 2018-10-18 RX ORDER — OXYCODONE HCL 5 MG/5 ML
5 SOLUTION, ORAL ORAL EVERY 6 HOURS PRN
Qty: 250 ML | Refills: 0 | Status: SHIPPED | OUTPATIENT
Start: 2018-10-18 | End: 2018-10-18 | Stop reason: SDUPTHER

## 2018-10-18 RX ORDER — AMOXICILLIN AND CLAVULANATE POTASSIUM 400; 57 MG/5ML; MG/5ML
POWDER, FOR SUSPENSION ORAL
Qty: 175 ML | Refills: 0 | Status: SHIPPED | OUTPATIENT
Start: 2018-10-18 | End: 2018-10-26

## 2018-10-18 RX ORDER — ACETAMINOPHEN 160 MG/5ML
650 SUSPENSION, ORAL (FINAL DOSE FORM) ORAL EVERY 8 HOURS
Qty: 355 ML | Refills: 0 | Status: SHIPPED | OUTPATIENT
Start: 2018-10-18 | End: 2018-10-25

## 2018-10-18 RX ORDER — AMOXICILLIN AND CLAVULANATE POTASSIUM 400; 57 MG/5ML; MG/5ML
875 POWDER, FOR SUSPENSION ORAL EVERY 12 HOURS SCHEDULED
Status: DISCONTINUED | OUTPATIENT
Start: 2018-10-18 | End: 2018-10-18 | Stop reason: HOSPADM

## 2018-10-18 RX ORDER — ACETAMINOPHEN 160 MG/5ML
650 SUSPENSION, ORAL (FINAL DOSE FORM) ORAL EVERY 8 HOURS
Status: DISCONTINUED | OUTPATIENT
Start: 2018-10-18 | End: 2018-10-18 | Stop reason: HOSPADM

## 2018-10-18 RX ORDER — METHOCARBAMOL 500 MG/1
500 TABLET, FILM COATED ORAL EVERY 6 HOURS SCHEDULED
Qty: 20 TABLET | Refills: 0 | Status: SHIPPED | OUTPATIENT
Start: 2018-10-18 | End: 2020-09-11

## 2018-10-18 RX ADMIN — METHOCARBAMOL 500 MG: 500 TABLET ORAL at 12:20

## 2018-10-18 RX ADMIN — OXYCODONE HYDROCHLORIDE 10 MG: 10 TABLET ORAL at 12:23

## 2018-10-18 RX ADMIN — ACETAMINOPHEN 975 MG: 325 TABLET, FILM COATED ORAL at 05:48

## 2018-10-18 RX ADMIN — HYDROMORPHONE HYDROCHLORIDE 0.5 MG: 1 INJECTION, SOLUTION INTRAMUSCULAR; INTRAVENOUS; SUBCUTANEOUS at 07:46

## 2018-10-18 RX ADMIN — METHOCARBAMOL 500 MG: 500 TABLET ORAL at 00:17

## 2018-10-18 RX ADMIN — OXYCODONE HYDROCHLORIDE 10 MG: 10 TABLET ORAL at 00:17

## 2018-10-18 RX ADMIN — CEFAZOLIN SODIUM 2000 MG: 2 SOLUTION INTRAVENOUS at 09:48

## 2018-10-18 RX ADMIN — ACETAMINOPHEN 650 MG: 160 SUSPENSION ORAL at 13:36

## 2018-10-18 RX ADMIN — OXYCODONE HYDROCHLORIDE 10 MG: 10 TABLET ORAL at 05:49

## 2018-10-18 RX ADMIN — HYDROMORPHONE HYDROCHLORIDE 0.5 MG: 1 INJECTION, SOLUTION INTRAMUSCULAR; INTRAVENOUS; SUBCUTANEOUS at 02:48

## 2018-10-18 RX ADMIN — METHOCARBAMOL 500 MG: 500 TABLET ORAL at 05:48

## 2018-10-18 RX ADMIN — CEFAZOLIN SODIUM 2000 MG: 2 SOLUTION INTRAVENOUS at 01:15

## 2018-10-18 NOTE — DISCHARGE SUMMARY
Discharge Summary - Trauma Service   Lisa Reynolds 20 y o  male MRN: [id number]  Unit/Bed#: [facility] 122-12 Encounter: [id number]    Admission Date: 10/15/2018     Discharge Date: 10/18/18    Admitting Diagnosis: Tooth impaction [K01 1]  Jaw swelling [R22 0]  Closed fracture of angle of mandible, initial encounter ([place] [address] ) [S02 650A]    Discharge Diagnosis: Mandibular fracture, tooth impaction    Attending and Service: Dr Ori Tate  Consulting Physician(s): OMFS    Imaging and Procedures Performed: No orders of the defined types were placed in this encounter  Hospital Course: Lisa Reynolds is a "20 y o  male who presents with left jaw pain  Patient states that his symptoms began acutely upon awakening today at 5:00 p m  He states that he had a fracture that was diagnosed on 09/25  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  He had signed out against medical advice, but was given amoxicillin and Tylenol Motrin for pain control  He was doing well, until yesterday when he noticed that when he woke up from his nap, he had swelling in the left side of his face, was unable to open his mouth "    He ultimately underwent ORIF of the mandibular fracture with tooth extraction on 10/17 with OMFS without complication  His pain has been well controlled and he has tolerated a diet  He is stable for discharge  On discharge, the patient is instructed to follow-up with the patient's primary care provider in the next one month to review the events of the patient's recent hospitalization  He should call [facility] for an appointment next week  He should maintain a wired jaw liquid/soft diet and was instructed to keep wire cutters on him at all times in the case of an airway emergency  He should take his antibiotics for 7 days as prescribed and maintain good oral hygiene to prevent infection  The patient may shower daily, but should avoid tub baths or swimming for 2 weeks  The patient is instructed to call OMS's office or return to the ER if develops a fever greater than 101, shaking chills, persistent nausea or vomiting, worsening or uncontrollable pain, and/or any increasing redness or purulent drainage from the patient's incision/wound  Condition at Discharge: good     Discharge instructions/Information to patient and family:   See after visit summary for information provided to patient and family  Provisions for Follow-Up Care:  See after visit summary for information related to follow-up care and any pertinent home health orders  Disposition: Home    Planned Readmission: No    Discharge Statement   I spent 45 minutes discharging the patient  This time was spent on the day of discharge  I had direct contact with the patient on the day of discharge  Additional documentation is required if more than 30 minutes were spent on discharge  Discharge Medications:  See after visit summary for reconciled discharge medications provided to patient and family        Brad Caballero MD  10/17/2018  8:37 PM

## 2018-10-18 NOTE — TELEPHONE ENCOUNTER
Anupama [phone] called stating that Chuyita ordered Oxycodone Liquid for patient is on back order at [place] and other pharmacy  Please advise

## 2018-10-18 NOTE — DISCHARGE INSTRUCTIONS
MANDIBLE FRACTURE POST-OPERATIVE INSTRUCTIONS    Antibiotics: After discharge from the office or hospital the patient is given prescriptions for antibiotic medication usually in liquid form or to be crushed and placed in liquid  It is VERY IMPORTANT to take these medications after surgery for the prevention of infection  Given the extensive variety of bacteria in the oral cavity the antibiotic regimen usually lasts for 7 days  If the patient develops a yellow or green discharge from the fracture site they must be seen at the office on the next business day or the oral surgery staff must be contacted for evaluation and adjustment of antibiotics  Not doing so may result in A life-threatening condition  Pain Control: After surgery patients usually require narcotic pain medications for 7 days  Prescriptions are provided for they usually in liquid form  Severe pain lasting longer than 7 days may indicate a serious problem and the patient needs to be evaluated on the next business day or the oral surgery staff contacted  Chronic pain may continue throughout the 6-8 weeks of fixation due to muscular degeneration and spasm  Swelling: Swelling is normal after mandible surgery  The swelling will be most noticeable on the 3rd and 4th day after surgery  If the swelling has not decreased after 6-7 days the patient should return to the office for an evaluation and contact the oral surgery staff  Numbness: Numbness may persist permanently after surgery  Often this numbness is attributable to the initial injury  The patient will be evaluated weekly for resolution of the numbness which may often be slow to progress  This numbness may change over time to a "pins and needles" or "burning and itching" feeling  Please let the oral surgery staff know at the next office visit which these changes occur  Diet: It is important to maintain a liquid or soft diet after fracture surgery   The plates and screws that may have been used are not designed to withstand the forces created by normal chewing and function  It is important to take in a minimum of 8466-5566 calories per day after surgery  This may be accomplished even on a liquid diet with the use of supplements such as Ensure, Slim Fast, Protein shakes, Boost, etc  Decreased calorie intake may lead to a decrease in immune system effectiveness and subsequent infection  A soft diet if recommended is described as nothing harder than the consistency of mashed potatoes  Foods that are included are eggs, soft pastas, pudding, ice cream and obviously liquids and pureed foods  Oral Hygiene: Patients MUST brush their teeth  Good oral hygiene is especially important after repair of a jaw fracture  Poor oral hygiene may lead to infection and failure of the fracture to heal  The patient will also be provided a prescription for an antimicrobial mouth rinse to be used twice a day  Excess use of the mouth wash may result in staining of the teeth  Wound Care: In addition to good oral hygiene, patients often have sutures that should be cared for after surgery  For incisions and sutures of the mouth normal saline rinses should be used 4 times a day  Wire and Elastics: Except for specific cases, wires and/or elastics are used for fixation of the fractures for a minimum of 6-8 weeks  These wires and elastics must remain tight for the fracture to heal  If the elastics or wires are removed or come off the patient, they must be seen on the next business day for re-application  Failure to do so may result in improper healing or unhealed fractures and infection  Follow up Visit: The patient needs to be seen one week after surgery  Appointments are then made in 1 week intervals unless determined on a cases by case basis   Failure to keep follow up appointments prevents the oral surgery staff from ensuring good care for the patient may lead to any number of the above mentioned problems often avoidable by routine visits  Concerns: May call [address] for Oral Surgery and Implantology at [phone]  Emergency: Go to the [address] at [facility] and ask for the Oral Surgeon on call  DO NOT WAIT until your post-operative appointment to consult [facility] [phone]

## 2018-10-18 NOTE — PROGRESS NOTES
Refusing infusion of IVF at this time  States "I feel like its backing my body up and I just don't want it"  Fluids stopped per pts request and Trauma made aware

## 2018-10-18 NOTE — ASSESSMENT & PLAN NOTE
-OMFS consulted, appreciate input  -Continue abx  -requires operative intervention  -continue to monitor  -no issues with breathing or swallowing at this time  -patient agreeable to surgery  -continue p r n  pain management  -s/p ORIF with OMFS on 10/17

## 2018-10-18 NOTE — UTILIZATION REVIEW
ADMIT OBS on  10/15  @  0200    Continued Stay Review    10/17   OR   1  ORIF left mandible fracture  2  Debridement associated with fracture left angle  3  Surgical extraction tooth 16  4   Partial bony impacted extraction tooth 17     Anesthesia Type: General  Operative Findings:  Partially consolidated left angle fracture       Date: 10/18/2018    Vital Signs: /55 (BP Location: Right arm)   Pulse (!) 54   Temp (!) 97 3 °F (36 3 °C)   Resp 18   Ht 6' 2" (1 88 m)   Wt 82 6 kg (182 lb)   SpO2 98%   BMI 23 37 kg/m²     Medications:   Scheduled Meds:   Current Facility-Administered Medications:  acetaminophen 975 mg Oral Q8H [address] Naveen Caban PA-C    cefazolin 2,000 mg Intravenous Q8H Ken Burciaga MD Last Rate: 2,000 mg (10/18/18 0115)   heparin (porcine) 5,000 Units Subcutaneous Q8H [address] Maty Longoria MD    HYDROmorphone 0 5 mg Intravenous Q4H PRN Naveen Caban PA-C    methocarbamol 500 mg Oral Q6H [address] Naveen Caban PA-C    oxyCODONE 10 mg Oral Q4H PRN Naveen Caban PA-C    oxyCODONE 5 mg Oral Q4H PRN Naveen Caban PA-C      Discharge Plan: tbd    [address] [facility]

## 2018-10-18 NOTE — TELEPHONE ENCOUNTER
I spoke with patient  Called multiple pharmacies and none carry liquid oxycodone  He is able to crush the oxycodone tablets and take with ensure which is how he was taking them in the hospital  This is not ideal given his wired jaw but it since I am otherwise unable to get him liquid oxycodone tonight I will prescribe a few tablets (5) so that he has enough to take 5-10 mg PRN every 6 hours if needed for severe pain  This will last him until tomorrow when he can come to the [address] [place] [address] here and  the liquid oxycodone which he was prescribed  He is agreeable to this plan and requests [facility] on [facility] for tonight's tablets and will report tomorrow to the [facility] clinic for his prescription for the liquid medication which is the preferred formula

## 2018-10-18 NOTE — PROGRESS NOTES
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   Assessment & Plan    -OMFS consulted, appreciate input  -Continue abx  -requires operative intervention  -continue to monitor  -no issues with breathing or swallowing at this time  -patient agreeable to surgery  -continue p r n  pain management  -s/p ORIF with OMFS on 10/17         Nurse-patient-provider rounds completed today with the patient's nurse  Disposition: Discharge today    Subjective: Patient offers no complaints this AM   Resting comfortably in bed      Objective:    Meds/Allergies   Prescriptions Prior to Admission   Medication Sig Dispense Refill Last Dose    acetaminophen (TYLENOL) 325 mg tablet Take 2 tablets (650 mg total) by mouth every 6 (six) hours as needed for mild pain 30 tablet 0 10/14/2018 at Unknown time    ibuprofen (MOTRIN) 600 mg tablet Take 1 tablet (600 mg total) by mouth every 8 (eight) hours as needed for mild pain 30 tablet 0 10/14/2018 at Unknown time    methocarbamol (ROBAXIN) 500 mg tablet Take 1 tablet (500 mg total) by mouth every 6 (six) hours 25 tablet 0 10/14/2018 at Unknown time    oxyCODONE (ROXICODONE) 5 mg immediate release tablet Take 5 mg by mouth every 6 (six) hours as needed for moderate pain   10/14/2018 at Unknown time       Current Facility-Administered Medications:     acetaminophen (TYLENOL) tablet 975 mg, 975 mg, Oral, Q8H [facility], Luis Miguel Munson PA-C, 975 mg at 10/17/18 3148    ceFAZolin (ANCEF) IVPB (premix) 2,000 mg, 2,000 mg, Intravenous, Q8H, Nuvia Levi MD, Last Rate: 100 mL/hr at 10/17/18 1730, 2,000 mg at 10/17/18 1730    heparin (porcine) subcutaneous injection 5,000 Units, 5,000 Units, Subcutaneous, Q8H [address], Mitul Jett MD    HYDROmorphone (DILAUDID) injection 0 5 mg, 0 5 mg, Intravenous, Q4H PRN, Jeff Anes, PA-C, 0 5 mg at 10/17/18 1735    methocarbamol (ROBAXIN) tablet 500 mg, 500 mg, Oral, Q6H [address], Jeff Anes, PA-C, 500 mg at 10/17/18 1730    oxyCODONE (ROXICODONE) immediate release tablet 10 mg, 10 mg, Oral, Q4H PRN, Jeff Anes, PA-C, 10 mg at 10/17/18 1849    oxyCODONE (ROXICODONE) IR tablet 5 mg, 5 mg, Oral, Q4H PRN, Jeff Anes, PA-C, 5 mg at 10/15/18 1120    sodium chloride 0 9 % infusion, 50 mL/hr, Intravenous, Continuous, ELIZABETH Jacobs, Last Rate: 50 mL/hr at 10/17/18 1610, 50 mL/hr at 10/17/18 1610    Vitals: Blood pressure 133/97, pulse 63, temperature 98 2 °F (36 8 °C), temperature source Axillary, resp  rate 18, height 6' 2" (1 88 m), weight 82 6 kg (182 lb), SpO2 99 %  Body mass index is 23 37 kg/m²  SpO2: SpO2: 99 %  Temp (24hrs), Av 4 °F (36 9 °C), Min:98 1 °F (36 7 °C), Max:98 9 °F (37 2 °C)  Current: Temperature: 98 2 °F (36 8 °C)    ABG: No results found for: PHART, SQA5XMR, PO2ART, FEV2OKZ, U3LPLVJZ, BEART, SOURCE      Intake/Output Summary (Last 24 hours) at 10/17/18 2035  Last data filed at 10/17/18 173   Gross per 24 hour   Intake             1630 ml   Output               50 ml   Net             1580 ml       Invasive Devices     Peripheral Intravenous Line            Peripheral IV 10/17/18 Left Arm less than 1 day                          Nutrition/GI Proph/Bowel Reg: Thin liquid diet    Physical Exam:     GENERAL APPEARANCE: Patient in no acute distress  HEENT: NCAT; PERRL, EOMs intact; Jaw wired shut  NECK / BACK: nontender  CV: Regular rate and rhythm; + S1, S2; no murmur/gallops/rubs appreciated  CHEST / LUNGS: Clear to auscultation; no wheezes/rales/rhonci  ABD: NABS; soft; non-distended; non-tender  EXT: +2 pulses bilaterally upper & lower extremities; no clubbing/cyanosis/edema    NEURO: GCS 15; no focal neurologic deficits; neurovascularly intact  SKIN: Warm, dry and well perfused; no rash; no jaundice  Lab Results: Results: I have personally reviewed pertinent reports  Imaging/EKG Studies: Results: I have personally reviewed pertinent reports      VTE Prophylaxis: SubQ heparin    Steven Segovia MD  10/17/2018

## 2018-10-20 LAB
BACTERIA BLD CULT: NORMAL
BACTERIA BLD CULT: NORMAL

## 2019-10-25 PROCEDURE — 99283 EMERGENCY DEPT VISIT LOW MDM: CPT

## 2019-10-26 ENCOUNTER — HOSPITAL ENCOUNTER (EMERGENCY)
Facility: HOSPITAL | Age: 24
Discharge: HOME | End: 2019-10-26
Attending: EMERGENCY MEDICINE | Admitting: EMERGENCY MEDICINE

## 2019-10-26 ENCOUNTER — APPOINTMENT (EMERGENCY)
Dept: RADIOLOGY | Facility: HOSPITAL | Age: 24
End: 2019-10-26

## 2019-10-26 VITALS
OXYGEN SATURATION: 98 % | SYSTOLIC BLOOD PRESSURE: 115 MMHG | RESPIRATION RATE: 14 BRPM | DIASTOLIC BLOOD PRESSURE: 54 MMHG | BODY MASS INDEX: 24.54 KG/M2 | TEMPERATURE: 98 F | WEIGHT: 191.14 LBS | HEART RATE: 53 BPM

## 2019-10-26 DIAGNOSIS — S60.221A TRAUMATIC HEMATOMA OF RIGHT HAND, INITIAL ENCOUNTER: Primary | ICD-10-CM

## 2019-10-26 PROCEDURE — 99283 EMERGENCY DEPT VISIT LOW MDM: CPT | Performed by: EMERGENCY MEDICINE

## 2019-10-26 PROCEDURE — 73130 X-RAY EXAM OF HAND: CPT

## 2019-10-26 NOTE — ED PROVIDER NOTES
History  Chief Complaint   Patient presents with    Hand Injury     pt  comes in with right hand injury after hurting it while boxing 2 weeks ago  States the pain and swelling on the middle knuckle has not gone down  History provided by:  Patient   used: No    61-year-old male presented for evaluation of swelling over his right 3rd knuckle over the last 2 weeks after injuring and boxing  States that it became swollen acutely and swelling has not gone down  Pain overall has improved but he still swollen  Pain mild to moderate, worse with use  Localized, nonradiating  He is able to move the hand without difficulty move all the fingers  There is no neurologic deficits  Will plan x-ray to rule out fracture and re-evaluate  Prior to Admission Medications   Prescriptions Last Dose Informant Patient Reported? Taking? methocarbamol (ROBAXIN) 500 mg tablet   No No   Sig: Take 1 tablet (500 mg total) by mouth every 6 (six) hours   methocarbamol (ROBAXIN) 500 mg tablet   No No   Sig: Take 1 tablet (500 mg total) by mouth every 6 (six) hours   oxyCODONE (ROXICODONE) 5 mg immediate release tablet   No No   Sig: Take 1 tablet (5 mg total) by mouth every 6 (six) hours as needed for severe pain Max Daily Amount: 20 mg   oxyCODONE (ROXICODONE) 5 mg/5 mL solution   No No   Sig: Take 5 mL (5 mg total) by mouth every 6 (six) hours as needed for severe pain Max Daily Amount: 20 mg      Facility-Administered Medications: None       History reviewed  No pertinent past medical history      Past Surgical History:   Procedure Laterality Date    ORIF MANDIBULAR FRACTURE Left 10/17/2018    Procedure: OPEN REDUCTION W/ INTERNAL FIXATION (ORIF) MANDIBULAR FRACTURE;  Surgeon: Mike Sebastian DMD;  [facility];  Service: Maxillofacial    LA IMPACT TOOTH [address] COMP BONY Left 10/17/2018    Procedure: EXTRACTION TEETH MULTIPLE, #16, 17;  Surgeon: Mike Sebastian DMD;  [facility];  Service: Maxillofacial       History reviewed  No pertinent family history  I have reviewed and agree with the history as documented  Social History     Tobacco Use    Smoking status: Never Smoker    Smokeless tobacco: Never Used   Substance Use Topics    Alcohol use: No    Drug use: No        Review of Systems   Constitutional: Negative for activity change, appetite change and fever  Skin: Positive for color change  Neurological: Negative for dizziness and weakness  All other systems reviewed and are negative  Physical Exam  Physical Exam   Constitutional: He appears well-developed and well-nourished  HENT:   Head: Normocephalic and atraumatic  Cardiovascular: Regular rhythm and intact distal pulses  Bradycardic  Musculoskeletal: Normal range of motion  Has swelling surrounding the right 3rd MCP joint with some mild fluctuance, tenderness  Slight erythema  Normal range of motion  Neurological: He is alert  No sensory deficit  He exhibits normal muscle tone  Skin: Skin is warm and dry  Psychiatric: He has a normal mood and affect  His behavior is normal    Nursing note and vitals reviewed  Vital Signs  ED Triage Vitals [10/26/19 0024]   Temperature Pulse Respirations Blood Pressure SpO2   98 °F (36 7 °C) (!) 53 14 115/54 98 %      Temp Source Heart Rate Source Patient Position - Orthostatic VS BP Location FiO2 (%)   Oral Monitor Lying Right arm --      Pain Score       --           Vitals:    10/26/19 0024   BP: 115/54   Pulse: (!) 53   Patient Position - Orthostatic VS: Lying         Visual Acuity      ED Medications  Medications - No data to display    Diagnostic Studies  Results Reviewed     None                 XR hand 3+ views RIGHT   ED Interpretation by Eli Peña MD (10/26 0230)   No fracture      Final Result by Amalia Bartlett MD (10/26 1103)      No acute osseous abnormality              Workstation performed: [id number]                    Procedures  Procedures ED Course                               MDM  Number of Diagnoses or Management Options  Traumatic hematoma of right hand, initial encounter: new and requires workup  Diagnosis management comments: 33-year-old male with boxing injury to the right hand 2 weeks ago with swelling over the 3rd MCP joint  X-ray negative for fracture  The injury consistent with a hematoma  Advised warm compresses, elevation, anti-inflammatories and will give follow-up info for hand surgery if symptoms persist or worsen  Amount and/or Complexity of Data Reviewed  Tests in the radiology section of CPT®: ordered and reviewed  Independent visualization of images, tracings, or specimens: yes    Patient Progress  Patient progress: stable      Disposition  Final diagnoses:   Traumatic hematoma of right hand, initial encounter     Time reflects when diagnosis was documented in both MDM as applicable and the Disposition within this note     Time User Action Codes Description Comment    10/26/2019 12:50 AM Priscilla Cortez Add [G98 [id number]] Traumatic hematoma of right hand, initial encounter       ED Disposition     ED Disposition Condition Date/Time Comment    Discharge Stable Sat Oct 26, 2019 12:51 AM [place] discharge to home/self care              Follow-up Information     Follow up With Specialties Details Why Contact Info    Phyllis Benton DO Orthopedic Surgery, Hand Surgery   [address]  [phone]            Discharge Medication List as of 10/26/2019 12:54 AM      CONTINUE these medications which have NOT CHANGED    Details   !! methocarbamol (ROBAXIN) 500 mg tablet Take 1 tablet (500 mg total) by mouth every 6 (six) hours, Starting Wed 9/26/2018, Print      !! methocarbamol (ROBAXIN) 500 mg tablet Take 1 tablet (500 mg total) by mouth every 6 (six) hours, Starting Thu 10/18/2018, Normal      oxyCODONE (ROXICODONE) 5 mg immediate release tablet Take 1 tablet (5 mg total) by mouth every 6 (six) hours as needed for severe pain Max Daily Amount: 20 mg, Starting Thu 10/18/2018, Normal      oxyCODONE (ROXICODONE) 5 mg/5 mL solution Take 5 mL (5 mg total) by mouth every 6 (six) hours as needed for severe pain Max Daily Amount: 20 mg, Starting Thu 10/18/2018, Normal       !! - Potential duplicate medications found  Please discuss with provider  No discharge procedures on file      ED Provider  Electronically Signed by           Caleb Bell MD  10/31/19 9395

## 2020-09-11 ENCOUNTER — HOSPITAL ENCOUNTER (EMERGENCY)
Facility: HOSPITAL | Age: 25
Discharge: HOME | End: 2020-09-12
Attending: INTERNAL MEDICINE

## 2020-09-11 ENCOUNTER — APPOINTMENT (EMERGENCY)
Dept: RADIOLOGY | Facility: HOSPITAL | Age: 25
End: 2020-09-11

## 2020-09-11 VITALS
SYSTOLIC BLOOD PRESSURE: 136 MMHG | TEMPERATURE: 98.5 F | WEIGHT: 178.6 LBS | OXYGEN SATURATION: 100 % | DIASTOLIC BLOOD PRESSURE: 74 MMHG | HEART RATE: 50 BPM | HEIGHT: 74 IN | BODY MASS INDEX: 22.92 KG/M2 | RESPIRATION RATE: 16 BRPM

## 2020-09-11 DIAGNOSIS — S62.354A CLOSED NONDISPLACED FRACTURE OF SHAFT OF FOURTH METACARPAL BONE OF RIGHT HAND, INITIAL ENCOUNTER: Primary | ICD-10-CM

## 2020-09-11 PROCEDURE — 73130 X-RAY EXAM OF HAND: CPT

## 2020-09-11 PROCEDURE — 99283 EMERGENCY DEPT VISIT LOW MDM: CPT

## 2020-09-11 RX ORDER — IBUPROFEN 600 MG/1
600 TABLET ORAL ONCE
Status: COMPLETED | OUTPATIENT
Start: 2020-09-12 | End: 2020-09-12

## 2020-09-12 PROCEDURE — 29125 APPL SHORT ARM SPLINT STATIC: CPT | Performed by: PHYSICIAN ASSISTANT

## 2020-09-12 PROCEDURE — 99284 EMERGENCY DEPT VISIT MOD MDM: CPT | Performed by: PHYSICIAN ASSISTANT

## 2020-09-12 RX ADMIN — IBUPROFEN 600 MG: 600 TABLET, FILM COATED ORAL at 00:12

## 2020-09-18 NOTE — ED PROVIDER NOTES
EMERGENCY MEDICINE NOTE        PATIENT IDENTIFICATION PHYSICIAN/SERVICE INFORMATION   Name: Kolton Ann  MRN: [id number]  [place]: 1995  Age/Sex: 30 y o  male  Preferred Language: English  Code Status: Prior  Encounter Date: 9/11/2020  Attending Physician: Courtney Pozo MD  Admitting Physician: No admitting provider for patient encounter  Primary Care Physician: No primary care provider on file  Primary Care Phone: None       CHIEF COMPLAINT     Chief Complaint   Patient presents with    Hand Injury     Patient report while sparing he heard a "pop" in right hand  Incident occurred on Tuesday and swelling has progressed since that time  Hx x2 prior fractures to right hand  HISTORY OF PRESENT ILLNESS       History provided by:  Patient   used: No    Hand Injury   Location:  Hand  Hand location:  R hand  Injury: yes    Mechanism of injury comment:  Patient report while sparing he heard a "pop" in right hand  Incident occurred on Tuesday and swelling has progressed since that time  Hx x2 prior fractures to right hand  Pain details:     Quality:  Sharp and aching    Radiates to:  Does not radiate    Severity:  Moderate    Onset quality:  Sudden    Timing:  Constant    Progression:  Waxing and waning  Handedness:  Right-handed  Dislocation: no    Foreign body present:  No foreign bodies  Prior injury to area:  Yes  Relieved by:  Nothing  Exacerbated by: tactile pressure, rom 4th digit  Ineffective treatments:  Acetaminophen  Associated symptoms: swelling    Associated symptoms: no back pain, no decreased range of motion, no fatigue, no fever, no muscle weakness, no neck pain, no numbness, no stiffness and no tingling    Risk factors: no concern for non-accidental trauma, no known bone disorder, no frequent fractures and no recent illness          PAST MEDICAL AND SURGICAL HISTORY     History reviewed  No pertinent past medical history      Past Surgical History:   Procedure Laterality Date    ORIF MANDIBULAR FRACTURE Left 10/17/2018    Procedure: OPEN REDUCTION W/ INTERNAL FIXATION (ORIF) MANDIBULAR FRACTURE;  Surgeon: James Oconnor DMD;  [facility];  Service: Maxillofacial    ND IMPACT TOOTH [address] COMP BONY Left 10/17/2018    Procedure: EXTRACTION TEETH MULTIPLE, #16, 17;  Surgeon: James Oconnor DMD;  [facility];  Service: Maxillofacial       History reviewed  No pertinent family history  E-Cigarette/Vaping     E-Cigarette/Vaping Substances     Social History     Tobacco Use    Smoking status: Never Smoker    Smokeless tobacco: Never Used   Substance Use Topics    Alcohol use: No    Drug use: No         ALLERGIES     No Known Allergies      HOME MEDICATIONS     None         REVIEW OF SYSTEMS     Review of Systems   Constitutional: Negative for activity change, appetite change, chills, diaphoresis, fatigue and fever  Respiratory: Negative for cough and shortness of breath  Cardiovascular: Negative for chest pain  Gastrointestinal: Negative for abdominal pain  Musculoskeletal: Positive for arthralgias and joint swelling  Negative for back pain, neck pain and stiffness  Skin: Negative for rash and wound  Neurological: Negative for headaches  All other systems reviewed and are negative  PHYSICAL EXAMINATION     ED Triage Vitals [09/11/20 2302]   Temperature Pulse Respirations Blood Pressure SpO2   98 5 °F (36 9 °C) (!) 50 16 136/74 100 %      Temp Source Heart Rate Source Patient Position - Orthostatic VS BP Location FiO2 (%)   Oral Monitor Sitting Right arm --      Pain Score       6         Wt Readings from Last 3 Encounters:   09/11/20 81 kg (178 lb 9 6 oz)   10/26/19 86 7 kg (191 lb 2 2 oz)   10/15/18 82 6 kg (182 lb)         Physical Exam  Vitals signs and nursing note reviewed  Constitutional:       General: He is not in acute distress  Appearance: He is well-developed  He is not diaphoretic     HENT: Head: Normocephalic and atraumatic  Mouth/Throat:      Mouth: Mucous membranes are moist    Eyes:      Conjunctiva/sclera: Conjunctivae normal       Pupils: Pupils are equal, round, and reactive to light  Neck:      Musculoskeletal: Normal range of motion and neck supple  Cardiovascular:      Rate and Rhythm: Normal rate and regular rhythm  Pulmonary:      Effort: Pulmonary effort is normal  No respiratory distress  Musculoskeletal: Normal range of motion  Right hand: He exhibits tenderness, bony tenderness and swelling  He exhibits normal range of motion (painful ROM 4th digit), normal two-point discrimination, normal capillary refill, no deformity and no laceration  Normal sensation noted  Normal strength noted  Hands:    Skin:     General: Skin is warm  Capillary Refill: Capillary refill takes less than 2 seconds  Neurological:      Mental Status: He is alert and oriented to person, place, and time  DIAGNOSTIC RESULTS     Laboratory results:    Labs Reviewed - No data to display    All labs reviewed and utilized in the medical decision making process    Radiology results:    XR hand 3+ views RIGHT   ED Interpretation   Acute vs subacute fx 4th metacarpal      Final Result      Nondisplaced fracture 4th metacarpal      Workstation performed: [id number]             All radiology studies independently viewed by me and interpreted by the radiologist       PROCEDURES     Splint application    Date/Time: 9/12/2020 12:24 AM  Performed by: Jyotsna Carvajal PA-C  Authorized by:  Jyotsna Carvajal PA-C     Patient location:  Bedside  Performing Provider:  PA and tech  Consent:     Consent obtained:  Verbal    Consent given by:  Patient    Risks discussed:  Discoloration, numbness, pain and swelling    Alternatives discussed:  No treatment, delayed treatment, alternative treatment, observation and referral  Universal protocol:     Procedure explained and questions answered to patient or proxy's satisfaction: yes      Radiology Images displayed and confirmed  If images not available, report reviewed: yes      Patient identity confirmed:  Verbally with patient, arm band and hospital-assigned identification number  Pre-procedure details:     Sensation:  Normal  Procedure details:     Laterality:  Right    Location:  Hand    Hand:  R hand    Strapping: no      Splint type:  Ulnar gutter    Supplies:  Cotton padding, elastic bandage and Ortho-Glass  Post-procedure details:     Pain:  Improved    Sensation:  Normal    Neurovascular Exam: skin pink, capillary refill <2 sec, normal pulses and skin intact, warm, and dry      Patient tolerance of procedure: Tolerated well, no immediate complications          Invasive Devices     None                 ASSESSMENT AND PLAN     MDM  Number of Diagnoses or Management Options  Closed nondisplaced fracture of shaft of fourth metacarpal bone of right hand, initial encounter: new, needed workup     Amount and/or Complexity of Data Reviewed  Tests in the radiology section of CPT®: ordered and reviewed  Review and summarize past medical records: yes  Independent visualization of images, tracings, or specimens: yes    Risk of Complications, Morbidity, and/or Mortality  Presenting problems: moderate  Diagnostic procedures: moderate  Management options: moderate    Patient Progress  Patient progress: improved      Initial ED assessment:  Kassie Arce is a 30 y o  male  who presents with R hand injury  Vitals signs reviewed and WNL  Physical examination is remarkable for focal swelling, ttp r 4th metacarpal, pain with rom 4th digit    Initial Ddx  includes but is not limited to:   contusion, fracture, sprain, strain, tendinitis, tenosynovitis, arthritis, carpal tunnel syndrome, cellulitis, lymphangitis, osteomyelitis, ischemic limb, ganglion cyst, radiculopathy, gout, diabetic neuropathy, lyme disease; doubt septic arthritis        Initial ED plan:   Plan will be to perform diagnostic testing of XR of hand and treat symptomatically  Final ED summary/disposition: Discussed results of diagnostic testing with Patient and Friend with Permission in detail  Greater than 10 minutes of education regarding diagnosis, testing, and ample opportunity for questions  Home care recommendations given with discharge paperwork  Return to ED instructions given if new/worsening sxs  Verbalized understanding  MDM  Reviewed: previous chart, nursing note and vitals  Interpretation: x-ray          ED COURSE OF CARE AND REASSESSMENT                                          Medications   ibuprofen (MOTRIN) tablet 600 mg (600 mg Oral Given 9/12/20 0012)         FINAL IMPRESSION     Final diagnoses:   Closed nondisplaced fracture of shaft of fourth metacarpal bone of right hand, initial encounter         [address]     Time reflects when diagnosis was documented in both MDM as applicable and the Disposition within this note     Time User Action Codes Description Comment    9/11/2020 11:47 PM Carmen Haas [[id number]] Closed nondisplaced fracture of shaft of fourth metacarpal bone of right hand, initial encounter       ED Disposition     ED Disposition Condition Date/Time Comment    Discharge Stable Fri Sep 11, 2020 11:47 PM Dipika Bunn discharge to home/self care              Follow-up Information     Follow up With Specialties Details Why Contact Info Additional [address] [facility] [place] [facility] Call  For follow up [address] [address] [address] [place] [facility] [place], [address] Or[address]ez [address] [facility]    [address]  Call  Call [employer] at  [phone] [facility] [phone]) to obtain a primary care physician for established care, reeval   They will be able to schedule you with a physician who sees patients with your insurance and physicians who see patients without insurance [phone] Internal Medicine [facility] Internal Medicine Call  For follow up [address] [address]  [address]  Internal Medicine [facility], [address] [place] [address], [facility] Emergency Department Emergency Medicine Go to  If symptoms worsen [address] [facility] [address] [phone] AN ED, [address], [place], [place], [address] [facility]  [address] [address] [phone]  Call   For follow up    [place]  [phone]    Call   Call Freda Oliver at  [phone] [facility] [phone]) to obtain a primary care physician for established care, reeval   They will be able to schedule you with a physician who sees patients with your insurance and physicians who see patients without insurance    [facility] Internal Medicine [facility] [address]  [phone]  Call   For follow up    [address] [id number] [phone]  Go to   If symptoms worsen        DISCHARGE MEDICATIONS     There are no discharge medications for this patient            PDMP Review     None          ASTON Osei PA-C  09/18/20 0071

## 2020-09-28 ENCOUNTER — HOSPITAL ENCOUNTER (EMERGENCY)
Facility: HOSPITAL | Age: 25
Discharge: HOME | End: 2020-09-29
Attending: EMERGENCY MEDICINE

## 2020-09-28 VITALS
SYSTOLIC BLOOD PRESSURE: 140 MMHG | HEART RATE: 51 BPM | DIASTOLIC BLOOD PRESSURE: 96 MMHG | WEIGHT: 190.48 LBS | RESPIRATION RATE: 18 BRPM | BODY MASS INDEX: 24.46 KG/M2 | OXYGEN SATURATION: 98 %

## 2020-09-28 DIAGNOSIS — S62.355A NONDISPLACED FRACTURE OF SHAFT OF FOURTH METACARPAL BONE, LEFT HAND, INITIAL ENCOUNTER FOR CLOSED FRACTURE: Primary | ICD-10-CM

## 2020-09-28 PROCEDURE — 99283 EMERGENCY DEPT VISIT LOW MDM: CPT

## 2020-09-29 ENCOUNTER — APPOINTMENT (EMERGENCY)
Dept: RADIOLOGY | Facility: HOSPITAL | Age: 25
End: 2020-09-29

## 2020-09-29 PROCEDURE — 29125 APPL SHORT ARM SPLINT STATIC: CPT | Performed by: PHYSICIAN ASSISTANT

## 2020-09-29 PROCEDURE — 99284 EMERGENCY DEPT VISIT MOD MDM: CPT | Performed by: PHYSICIAN ASSISTANT

## 2020-09-29 PROCEDURE — 73130 X-RAY EXAM OF HAND: CPT

## 2020-09-29 RX ORDER — OXYCODONE HYDROCHLORIDE AND ACETAMINOPHEN 5; 325 MG/1; MG/1
1 TABLET ORAL EVERY 6 HOURS PRN
Qty: 8 TABLET | Refills: 0 | Status: SHIPPED | OUTPATIENT
Start: 2020-09-29 | End: 2022-07-26

## 2020-09-29 RX ORDER — IBUPROFEN 400 MG/1
400 TABLET ORAL ONCE
Status: COMPLETED | OUTPATIENT
Start: 2020-09-29 | End: 2020-09-29

## 2020-09-29 RX ORDER — KETOROLAC TROMETHAMINE 30 MG/ML
15 INJECTION, SOLUTION INTRAMUSCULAR; INTRAVENOUS ONCE
Status: DISCONTINUED | OUTPATIENT
Start: 2020-09-29 | End: 2020-09-29

## 2020-09-29 RX ADMIN — IBUPROFEN 400 MG: 400 TABLET ORAL at 01:09

## 2020-10-09 ENCOUNTER — TELEPHONE (OUTPATIENT)
Dept: OBGYN CLINIC | Facility: CLINIC | Age: 25
End: 2020-10-09

## 2020-11-23 ENCOUNTER — HOSPITAL ENCOUNTER (EMERGENCY)
Facility: HOSPITAL | Age: 25
Discharge: HOME | End: 2020-11-23
Attending: EMERGENCY MEDICINE

## 2020-11-23 VITALS
DIASTOLIC BLOOD PRESSURE: 89 MMHG | BODY MASS INDEX: 23.11 KG/M2 | HEART RATE: 45 BPM | WEIGHT: 180 LBS | TEMPERATURE: 97.2 F | OXYGEN SATURATION: 100 % | RESPIRATION RATE: 18 BRPM | SYSTOLIC BLOOD PRESSURE: 153 MMHG

## 2020-11-23 DIAGNOSIS — K08.89 PAIN, DENTAL: Primary | ICD-10-CM

## 2020-11-23 PROCEDURE — 96372 THER/PROPH/DIAG INJ SC/IM: CPT

## 2020-11-23 PROCEDURE — 99284 EMERGENCY DEPT VISIT MOD MDM: CPT | Performed by: EMERGENCY MEDICINE

## 2020-11-23 PROCEDURE — 99283 EMERGENCY DEPT VISIT LOW MDM: CPT

## 2020-11-23 RX ORDER — KETOROLAC TROMETHAMINE 30 MG/ML
15 INJECTION, SOLUTION INTRAMUSCULAR; INTRAVENOUS ONCE
Status: COMPLETED | OUTPATIENT
Start: 2020-11-23 | End: 2020-11-23

## 2020-11-23 RX ORDER — ACETAMINOPHEN 325 MG/1
650 TABLET ORAL ONCE
Status: COMPLETED | OUTPATIENT
Start: 2020-11-23 | End: 2020-11-23

## 2020-11-23 RX ORDER — LIDOCAINE HYDROCHLORIDE 20 MG/ML
15 SOLUTION OROPHARYNGEAL ONCE
Status: COMPLETED | OUTPATIENT
Start: 2020-11-23 | End: 2020-11-23

## 2020-11-23 RX ADMIN — LIDOCAINE HYDROCHLORIDE 15 ML: 20 SOLUTION ORAL; TOPICAL at 03:47

## 2020-11-23 RX ADMIN — KETOROLAC TROMETHAMINE 15 MG: 30 INJECTION, SOLUTION INTRAMUSCULAR at 03:47

## 2020-11-23 RX ADMIN — ACETAMINOPHEN 650 MG: 325 TABLET, FILM COATED ORAL at 03:47

## 2021-03-25 ENCOUNTER — HOSPITAL ENCOUNTER (EMERGENCY)
Facility: HOSPITAL | Age: 26
Discharge: HOME | End: 2021-03-25
Attending: EMERGENCY MEDICINE | Admitting: EMERGENCY MEDICINE

## 2021-03-25 ENCOUNTER — APPOINTMENT (EMERGENCY)
Dept: RADIOLOGY | Facility: HOSPITAL | Age: 26
End: 2021-03-25

## 2021-03-25 VITALS
SYSTOLIC BLOOD PRESSURE: 127 MMHG | HEART RATE: 56 BPM | DIASTOLIC BLOOD PRESSURE: 74 MMHG | RESPIRATION RATE: 16 BRPM | OXYGEN SATURATION: 100 % | TEMPERATURE: 98.2 F

## 2021-03-25 DIAGNOSIS — S49.92XA INJURY OF LEFT SHOULDER, INITIAL ENCOUNTER: Primary | ICD-10-CM

## 2021-03-25 PROCEDURE — 99284 EMERGENCY DEPT VISIT MOD MDM: CPT | Performed by: PHYSICIAN ASSISTANT

## 2021-03-25 PROCEDURE — 73030 X-RAY EXAM OF SHOULDER: CPT

## 2021-03-25 PROCEDURE — 99283 EMERGENCY DEPT VISIT LOW MDM: CPT

## 2021-03-25 NOTE — DISCHARGE INSTRUCTIONS
Rotator Cuff Injury   WHAT YOU NEED TO KNOW:   A rotator cuff injury is damage to the muscles or tendons of your rotator cuff  The rotator cuff is a group of muscles and tendons that hold the shoulder joint in place  The damage may include muscle stretching, tendon tears, or bursa inflammation  The bursa is a fluid sac around the joint  DISCHARGE INSTRUCTIONS:   Call your doctor or orthopedist if:   · You suddenly cannot move your arm  · The pain in your shoulder or arm is not improving, or is worse than before you started treatment  · You have new pain in your neck  · You have questions or concerns about your condition or care  Medicines: You may need any of the following:  · Acetaminophen  decreases pain and fever  It is available without a doctor's order  Ask how much to take and how often to take it  Follow directions  Read the labels of all other medicines you are using to see if they also contain acetaminophen, or ask your doctor or pharmacist  Acetaminophen can cause liver damage if not taken correctly  Do not use more than 4 grams (4,000 milligrams) total of acetaminophen in one day  · NSAIDs  help decrease swelling and pain or fever  This medicine is available with or without a doctor's order  NSAIDs can cause stomach bleeding or kidney problems in certain people  If you take blood thinner medicine, always ask your healthcare provider if NSAIDs are safe for you  Always read the medicine label and follow directions  · Prescription pain medicine  may be given  Ask your healthcare provider how to take this medicine safely  Some prescription pain medicines contain acetaminophen  Do not take other medicines that contain acetaminophen without talking to your healthcare provider  Too much acetaminophen may cause liver damage  Prescription pain medicine may cause constipation  Ask your healthcare provider how to prevent or treat constipation  · Take your medicine as directed  Contact your healthcare provider if you think your medicine is not helping or if you have side effects  Tell him or her if you are allergic to any medicine  Keep a list of the medicines, vitamins, and herbs you take  Include the amounts, and when and why you take them  Bring the list or the pill bottles to follow-up visits  Carry your medicine list with you in case of an emergency  A physical therapist  can teach you exercises to help improve shoulder movement and strength, and decrease pain  You may learn changes to daily activities that will help decrease stress on your tendons  Self-care:   · Rest  may help your shoulder heal  Overuse of your shoulder can make your injury worse  Avoid heavy lifting, putting your arms over your head, or sports that need an overhead or throwing motion  Any of these movements can cause or worsen a rotator cuff injury  · Put ice on your shoulder  every few hours for the first several days  Ice helps decrease pain and swelling  Use an ice pack, or put crushed ice in a plastic bag  Wrap a towel around the bag before you put it on your shoulder  Apply ice for 15 minutes every hour, or as directed  · Put heat on your shoulder  when directed  After the first several days, heat may help relax the muscles in your shoulder  Use a heat pack or heating pad  Apply heat for 20 minutes every hour, or as directed  Follow up with your doctor or orthopedist as directed:  Write down your questions so you remember to ask them during your visits  © Copyright [address] Information is for End User's use only and may not be sold, redistributed or otherwise used for commercial purposes  All illustrations and images included in CareNotes® are the copyrighted property of A D A M , Inc  or [facility][note 3] Charlie Washington   The above information is an  only  It is not intended as medical advice for individual conditions or treatments   Talk to your doctor, nurse or pharmacist before following any medical regimen to see if it is safe and effective for you

## 2021-03-25 NOTE — ED PROVIDER NOTES
History  Chief Complaint   Patient presents with    Shoulder Injury     Pt presents to the ED with c/o left shoulder injury that occurred last week while polo Hurtado is a 30 y o  male who presents to the ED with complaints of left superior/posterior shoulder pain over the past week  Patient states he was sparring multiple times on Thursday afternoon when shortly after he was having pain  Patient states he feels like his "pop" of the left shoulder is weaker than normal  Patient reports previous collarbone injury in childhood  Denies neck pain, numbness, tingling, weakness erythema, edema, ecchymosis, previous surgery, chest pain, shortness of breath, fever, chills  No over-the-counter medications taken prior to arrival   Patient is right-handed  Patient is concerned because he has 2 "big fights" coming up in the next few weeks  History provided by:  Patient  Shoulder Injury  Location:  Shoulder  Shoulder location:  L shoulder  Injury: yes    Time since incident:  1 week  Pain details:     Quality:  Throbbing    Duration:  1 week  Handedness:  Right-handed  Associated symptoms: decreased range of motion    Associated symptoms: no back pain, no fatigue, no fever, no muscle weakness, no neck pain, no numbness, no stiffness, no swelling and no tingling        Prior to Admission Medications   Prescriptions Last Dose Informant Patient Reported?  Taking?   oxyCODONE-acetaminophen (PERCOCET) 5-325 mg per tablet   No No   Sig: Take 1 tablet by mouth every 6 (six) hours as needed for moderate pain for up to 8 dosesMax Daily Amount: 4 tablets      Facility-Administered Medications: None       Past Medical History:   Diagnosis Date    Hand fracture     3 weeks ago       Past Surgical History:   Procedure Laterality Date    ORIF MANDIBULAR FRACTURE Left 10/17/2018    Procedure: OPEN REDUCTION W/ INTERNAL FIXATION (ORIF) MANDIBULAR FRACTURE;  Surgeon: Ilana Rutledge DMD;  [facility]: [facility]; Service: Maxillofacial    AL IMPACT TOOTH REMOV COMP BONY Left 10/17/2018    Procedure: EXTRACTION TEETH MULTIPLE, #16, 17;  Surgeon: Nevaeh Muhammad DMD;  [facility];  Service: Maxillofacial       No family history on file  I have reviewed and agree with the history as documented  E-Cigarette/Vaping    E-Cigarette Use Never User      E-Cigarette/Vaping Substances     Social History     Tobacco Use    Smoking status: Never Smoker    Smokeless tobacco: Never Used   Substance Use Topics    Alcohol use: No    Drug use: No       Review of Systems   Constitutional: Negative for appetite change, chills, fatigue, fever and unexpected weight change  HENT: Negative for congestion, drooling, ear pain, rhinorrhea, sore throat, trouble swallowing and voice change  Eyes: Negative for pain, discharge, redness and visual disturbance  Respiratory: Negative for cough, shortness of breath, wheezing and stridor  Cardiovascular: Negative for chest pain, palpitations and leg swelling  Gastrointestinal: Negative for abdominal pain, blood in stool, constipation, diarrhea, nausea and vomiting  Genitourinary: Negative for dysuria, flank pain, frequency, hematuria and urgency  Musculoskeletal: Positive for arthralgias  Negative for back pain, gait problem, joint swelling, neck pain, neck stiffness and stiffness  Skin: Negative for color change and rash  Neurological: Negative for dizziness, seizures, light-headedness and headaches  Physical Exam  Physical Exam  Vitals signs and nursing note reviewed  Constitutional:       Appearance: He is well-developed  HENT:      Head: Normocephalic and atraumatic  Nose: Nose normal    Eyes:      Conjunctiva/sclera: Conjunctivae normal       Pupils: Pupils are equal, round, and reactive to light  Neck:      Musculoskeletal: Normal range of motion and neck supple  Cardiovascular:      Rate and Rhythm: Normal rate and regular rhythm     Pulmonary: Effort: Pulmonary effort is normal       Breath sounds: Normal breath sounds  Musculoskeletal:      Left shoulder: He exhibits decreased range of motion and tenderness  He exhibits no swelling  Comments: TTP of the teres major of the left shoulder  Mild TTP of the superior aspect of the left shoulder  No erythema or edema  No ecchymosis  Full ROM and strength in shoulder upon adduction, abduction, internal and external rotation  Neurovascularly intact  Skin:     General: Skin is warm and dry  Capillary Refill: Capillary refill takes less than 2 seconds  Neurological:      Mental Status: He is alert and oriented to person, place, and time  GCS: GCS eye subscore is 4  GCS verbal subscore is 5  GCS motor subscore is 6  Cranial Nerves: Cranial nerves are intact  Sensory: Sensation is intact  Motor: Motor function is intact  Vital Signs  ED Triage Vitals [03/25/21 1437]   Temperature Pulse Respirations Blood Pressure SpO2   98 2 °F (36 8 °C) 56 16 127/74 100 %      Temp Source Heart Rate Source Patient Position - Orthostatic VS BP Location FiO2 (%)   Oral Monitor -- -- --      Pain Score       5           Vitals:    03/25/21 1437   BP: 127/74   Pulse: 56         Visual Acuity      ED Medications  Medications - No data to display    Diagnostic Studies  Results Reviewed     None                 XR shoulder 2+ views LEFT   ED Interpretation by Kanu Wayne PA-C (03/25 1547)   No acute fracture or dislocation                 Procedures  Procedures         ED Course  ED Course as of Mar 25 1553   Thu Mar 25, 2021   1547 Educated patient regarding diagnosis and management  Advised patient to follow up with PCP  Advised patient to RTER for persistent or worsening symptoms                                                 MDM  Number of Diagnoses or Management Options  Injury of left shoulder, initial encounter: new and requires workup  Diagnosis management comments: XR Left Shoulder without acute findings  Patient had tenderness to palpation of the teres major on examination however given repetitive use of the left upper extremity prior to the onset of pain, I am concerned rotator cuff tear versus tendinitis versus labrum tear  Patient was instructed to follow up outpatient with orthopedics  I provided patient with strict RTER precautions  I advised patient follow-up with PCP in 24-48 hours  Patient verbalized understanding          Amount and/or Complexity of Data Reviewed  Tests in the radiology section of CPT®: reviewed and ordered  Review and summarize past medical records: yes    Patient Progress  Patient progress: stable      Disposition  Final diagnoses:   Injury of left shoulder, initial encounter     Time reflects when diagnosis was documented in both MDM as applicable and the Disposition within this note     Time User Action Codes Description Comment    3/25/2021  3:52 PM Kay Cheng Add [Q19 [id number]] Injury of left shoulder, initial encounter     3/25/2021  3:52 PM Joan, [address] Injury of left shoulder, initial encounter Suspect Rotator Cuff/Teres Major Injury    3/25/2021  3:52 PM [address] [place] [address] Fina, [address] Injury of left shoulder, initial encounter       ED Disposition     None      Follow-up Information     Follow up With Specialties Details Why Contact Info Additional [address] Emergency Department Emergency Medicine Go to  If symptoms worsen [address] [facility] [address] [facility] Emergency Department, [address], [place], [place], [address] [facility]   [address] [phone] [address] [facility] [place], [address], [address] [place], [place], [facility]          Patient's Medications   Discharge Prescriptions    No medications on file     No discharge procedures on file      PDMP Review     None          ED Provider  Electronically Signed by           Debbie Abad PA-C  03/25/21 3544

## 2021-12-04 ENCOUNTER — HOSPITAL ENCOUNTER (EMERGENCY)
Facility: HOSPITAL | Age: 26
Discharge: HOME | End: 2021-12-05
Admitting: EMERGENCY MEDICINE

## 2021-12-04 PROCEDURE — 99283 EMERGENCY DEPT VISIT LOW MDM: CPT

## 2021-12-05 ENCOUNTER — APPOINTMENT (EMERGENCY)
Dept: RADIOLOGY | Facility: HOSPITAL | Age: 26
End: 2021-12-05

## 2021-12-05 VITALS
TEMPERATURE: 98.1 F | HEART RATE: 48 BPM | DIASTOLIC BLOOD PRESSURE: 85 MMHG | RESPIRATION RATE: 16 BRPM | BODY MASS INDEX: 24.29 KG/M2 | WEIGHT: 189.15 LBS | OXYGEN SATURATION: 100 % | SYSTOLIC BLOOD PRESSURE: 133 MMHG

## 2021-12-05 PROCEDURE — 73130 X-RAY EXAM OF HAND: CPT

## 2021-12-05 PROCEDURE — 73030 X-RAY EXAM OF SHOULDER: CPT

## 2022-07-25 ENCOUNTER — HOSPITAL ENCOUNTER (OUTPATIENT)
Facility: HOSPITAL | Age: 27
Setting detail: OBSERVATION
Discharge: HOME | End: 2022-07-26
Attending: EMERGENCY MEDICINE | Admitting: SURGERY
Payer: MEDICARE

## 2022-07-25 DIAGNOSIS — S02.651B: ICD-10-CM

## 2022-07-25 DIAGNOSIS — S02.650A: Primary | ICD-10-CM

## 2022-07-25 PROCEDURE — 99285 EMERGENCY DEPT VISIT HI MDM: CPT

## 2022-07-25 NOTE — LETTER
[place]  [facility] [id number]  Dept: [phone]    July 26, 2022     Patient: Guevara David   YOB: 1995   Date of Visit: 7/25/2022       To Whom it May Concern:    Karen Toussaint is under my professional care  He was seen in the hospital from 7/25/2022   to 07/26/22  He Was out of work today secondary to a fractured jaw requiring surgical intervention  He will need to carry a surgical  with him at all times shold he need to cut the wire  He may return to work as long as he can protect his face       If you have any questions or concerns, please don't hesitate to call           Sincerely,          ELIZABETH Laughlin

## 2022-07-26 ENCOUNTER — APPOINTMENT (EMERGENCY)
Dept: CT IMAGING | Facility: HOSPITAL | Age: 27
End: 2022-07-26
Payer: MEDICARE

## 2022-07-26 ENCOUNTER — ANESTHESIA EVENT (OUTPATIENT)
Dept: PERIOP | Facility: HOSPITAL | Age: 27
End: 2022-07-26
Payer: MEDICARE

## 2022-07-26 ENCOUNTER — ANESTHESIA (OUTPATIENT)
Dept: PERIOP | Facility: HOSPITAL | Age: 27
End: 2022-07-26
Payer: MEDICARE

## 2022-07-26 VITALS
RESPIRATION RATE: 18 BRPM | DIASTOLIC BLOOD PRESSURE: 54 MMHG | OXYGEN SATURATION: 94 % | SYSTOLIC BLOOD PRESSURE: 106 MMHG | TEMPERATURE: 97.8 F | HEART RATE: 43 BPM

## 2022-07-26 LAB
ABO GROUP BLD: NORMAL
ANION GAP SERPL CALCULATED.3IONS-SCNC: 5 MMOL/L (ref 4–13)
BLD GP AB SCN SERPL QL: NEGATIVE
BUN SERPL-MCNC: 13 MG/DL (ref 5–25)
CALCIUM SERPL-MCNC: 9.2 MG/DL (ref 8.4–10.2)
CHLORIDE SERPL-SCNC: 105 MMOL/L (ref 96–108)
CO2 SERPL-SCNC: 28 MMOL/L (ref 21–32)
CREAT SERPL-MCNC: 1.22 MG/DL (ref 0.6–1.3)
ERYTHROCYTE [DISTWIDTH] IN BLOOD BY AUTOMATED COUNT: 13.4 % (ref 11.6–15.1)
GFR SERPL CREATININE-BSD FRML MDRD: 80 ML/MIN/1.73SQ M
GLUCOSE SERPL-MCNC: 87 MG/DL (ref 65–140)
HCT VFR BLD AUTO: 41.2 % (ref 36.5–49.3)
HGB BLD-MCNC: 13.9 G/DL (ref 12–17)
MCH RBC QN AUTO: 29.6 PG (ref 26.8–34.3)
MCHC RBC AUTO-ENTMCNC: 33.7 G/DL (ref 31.4–37.4)
MCV RBC AUTO: 88 FL (ref 82–98)
PLATELET # BLD AUTO: 217 THOUSANDS/UL (ref 149–390)
PMV BLD AUTO: 9.9 FL (ref 8.9–12.7)
POTASSIUM SERPL-SCNC: 3.6 MMOL/L (ref 3.5–5.3)
RBC # BLD AUTO: 4.69 MILLION/UL (ref 3.88–5.62)
RH BLD: POSITIVE
SODIUM SERPL-SCNC: 138 MMOL/L (ref 135–147)
SPECIMEN EXPIRATION DATE: NORMAL
WBC # BLD AUTO: 7.24 THOUSAND/UL (ref 4.31–10.16)

## 2022-07-26 PROCEDURE — C1713 ANCHOR/SCREW BN/BN,TIS/BN: HCPCS | Performed by: DENTIST

## 2022-07-26 PROCEDURE — G1004 CDSM NDSC: HCPCS

## 2022-07-26 PROCEDURE — 85027 COMPLETE CBC AUTOMATED: CPT | Performed by: PHYSICIAN ASSISTANT

## 2022-07-26 PROCEDURE — 36415 COLL VENOUS BLD VENIPUNCTURE: CPT | Performed by: PHYSICIAN ASSISTANT

## 2022-07-26 PROCEDURE — 99235 HOSP IP/OBS SAME DATE MOD 70: CPT | Performed by: SURGERY

## 2022-07-26 PROCEDURE — 80048 BASIC METABOLIC PNL TOTAL CA: CPT | Performed by: PHYSICIAN ASSISTANT

## 2022-07-26 PROCEDURE — 70486 CT MAXILLOFACIAL W/O DYE: CPT

## 2022-07-26 PROCEDURE — 99285 EMERGENCY DEPT VISIT HI MDM: CPT | Performed by: EMERGENCY MEDICINE

## 2022-07-26 PROCEDURE — 86850 RBC ANTIBODY SCREEN: CPT | Performed by: PHYSICIAN ASSISTANT

## 2022-07-26 PROCEDURE — 86901 BLOOD TYPING SEROLOGIC RH(D): CPT | Performed by: PHYSICIAN ASSISTANT

## 2022-07-26 PROCEDURE — NC001 PR NO CHARGE: Performed by: NURSE PRACTITIONER

## 2022-07-26 PROCEDURE — 86900 BLOOD TYPING SEROLOGIC ABO: CPT | Performed by: PHYSICIAN ASSISTANT

## 2022-07-26 DEVICE — MATRIXMANDIBLE PRE-BNT TENSION BAND PL/RIGHT/2X2H/1.0MM THK
Type: IMPLANTABLE DEVICE | Site: MANDIBLE | Status: FUNCTIONAL
Brand: MATRIXMANDIBLE

## 2022-07-26 DEVICE — 2.0MM TI MATRIXMANDIBLE SCREW SELF-TAPPING 8MM
Type: IMPLANTABLE DEVICE | Site: MANDIBLE | Status: FUNCTIONAL
Brand: MATRIXMANDIBLE

## 2022-07-26 DEVICE — 2.0MM IMF SCREW SELF-DRILLING 8MM: Type: IMPLANTABLE DEVICE | Site: MANDIBLE | Status: FUNCTIONAL

## 2022-07-26 DEVICE — 0.6MM PRECUT CERCLAGE WIRE 175MM: Type: IMPLANTABLE DEVICE | Site: MANDIBLE | Status: FUNCTIONAL

## 2022-07-26 RX ORDER — KETOROLAC TROMETHAMINE 30 MG/ML
15 INJECTION, SOLUTION INTRAMUSCULAR; INTRAVENOUS EVERY 6 HOURS SCHEDULED
Status: DISCONTINUED | OUTPATIENT
Start: 2022-07-26 | End: 2022-07-27 | Stop reason: HOSPADM

## 2022-07-26 RX ORDER — ACETAMINOPHEN 160 MG/5ML
975 SUSPENSION, ORAL (FINAL DOSE FORM) ORAL EVERY 8 HOURS
Qty: 456 ML | Refills: 0 | Status: SHIPPED | OUTPATIENT
Start: 2022-07-26 | End: 2022-07-31

## 2022-07-26 RX ORDER — HYDROMORPHONE HCL/PF 1 MG/ML
0.5 SYRINGE (ML) INJECTION
Status: DISCONTINUED | OUTPATIENT
Start: 2022-07-26 | End: 2022-07-26 | Stop reason: HOSPADM

## 2022-07-26 RX ORDER — METHOCARBAMOL 500 MG/1
500 TABLET, FILM COATED ORAL EVERY 6 HOURS SCHEDULED
Status: DISCONTINUED | OUTPATIENT
Start: 2022-07-26 | End: 2022-07-27 | Stop reason: HOSPADM

## 2022-07-26 RX ORDER — BUPIVACAINE HYDROCHLORIDE AND EPINEPHRINE 5; 5 MG/ML; UG/ML
INJECTION, SOLUTION PERINEURAL AS NEEDED
Status: DISCONTINUED | OUTPATIENT
Start: 2022-07-26 | End: 2022-07-26 | Stop reason: HOSPADM

## 2022-07-26 RX ORDER — MAGNESIUM HYDROXIDE 1200 MG/15ML
LIQUID ORAL AS NEEDED
Status: DISCONTINUED | OUTPATIENT
Start: 2022-07-26 | End: 2022-07-26 | Stop reason: HOSPADM

## 2022-07-26 RX ORDER — METHOCARBAMOL 500 MG/1
500 TABLET, FILM COATED ORAL EVERY 6 HOURS SCHEDULED
Qty: 28 TABLET | Refills: 0 | Status: SHIPPED | OUTPATIENT
Start: 2022-07-27 | End: 2022-08-03

## 2022-07-26 RX ORDER — OXYCODONE HCL 5 MG/5 ML
10 SOLUTION, ORAL ORAL EVERY 4 HOURS PRN
Status: DISCONTINUED | OUTPATIENT
Start: 2022-07-26 | End: 2022-07-27 | Stop reason: HOSPADM

## 2022-07-26 RX ORDER — LIDOCAINE HYDROCHLORIDE 10 MG/ML
INJECTION, SOLUTION EPIDURAL; INFILTRATION; INTRACAUDAL; PERINEURAL AS NEEDED
Status: DISCONTINUED | OUTPATIENT
Start: 2022-07-26 | End: 2022-07-26

## 2022-07-26 RX ORDER — CHLORHEXIDINE GLUCONATE 0.12 MG/ML
RINSE ORAL AS NEEDED
Status: DISCONTINUED | OUTPATIENT
Start: 2022-07-26 | End: 2022-07-26 | Stop reason: HOSPADM

## 2022-07-26 RX ORDER — ONDANSETRON 2 MG/ML
4 INJECTION INTRAMUSCULAR; INTRAVENOUS ONCE AS NEEDED
Status: DISCONTINUED | OUTPATIENT
Start: 2022-07-26 | End: 2022-07-26 | Stop reason: HOSPADM

## 2022-07-26 RX ORDER — ONDANSETRON 2 MG/ML
INJECTION INTRAMUSCULAR; INTRAVENOUS AS NEEDED
Status: DISCONTINUED | OUTPATIENT
Start: 2022-07-26 | End: 2022-07-26

## 2022-07-26 RX ORDER — ENOXAPARIN SODIUM 100 MG/ML
30 INJECTION SUBCUTANEOUS EVERY 12 HOURS
Status: DISCONTINUED | OUTPATIENT
Start: 2022-07-26 | End: 2022-07-27 | Stop reason: HOSPADM

## 2022-07-26 RX ORDER — CHLORHEXIDINE GLUCONATE 0.12 MG/ML
15 RINSE ORAL ONCE
Status: COMPLETED | OUTPATIENT
Start: 2022-07-26 | End: 2022-07-26

## 2022-07-26 RX ORDER — HYDROMORPHONE HCL/PF 1 MG/ML
0.5 SYRINGE (ML) INJECTION
Status: DISCONTINUED | OUTPATIENT
Start: 2022-07-26 | End: 2022-07-27 | Stop reason: HOSPADM

## 2022-07-26 RX ORDER — OXYCODONE HCL 5 MG/5 ML
5 SOLUTION, ORAL ORAL EVERY 4 HOURS PRN
Status: DISCONTINUED | OUTPATIENT
Start: 2022-07-26 | End: 2022-07-27 | Stop reason: HOSPADM

## 2022-07-26 RX ORDER — IBUPROFEN 600 MG/1
600 TABLET ORAL ONCE
Status: DISCONTINUED | OUTPATIENT
Start: 2022-07-26 | End: 2022-07-26

## 2022-07-26 RX ORDER — ACETAMINOPHEN 160 MG/5ML
650 SUSPENSION, ORAL (FINAL DOSE FORM) ORAL ONCE
Status: COMPLETED | OUTPATIENT
Start: 2022-07-26 | End: 2022-07-26

## 2022-07-26 RX ORDER — ACETAMINOPHEN 160 MG/5ML
975 SUSPENSION, ORAL (FINAL DOSE FORM) ORAL EVERY 8 HOURS
Status: DISCONTINUED | OUTPATIENT
Start: 2022-07-26 | End: 2022-07-27 | Stop reason: HOSPADM

## 2022-07-26 RX ORDER — FENTANYL CITRATE/PF 50 MCG/ML
25 SYRINGE (ML) INJECTION
Status: DISCONTINUED | OUTPATIENT
Start: 2022-07-26 | End: 2022-07-26 | Stop reason: HOSPADM

## 2022-07-26 RX ORDER — DEXAMETHASONE SODIUM PHOSPHATE 10 MG/ML
INJECTION, SOLUTION INTRAMUSCULAR; INTRAVENOUS AS NEEDED
Status: DISCONTINUED | OUTPATIENT
Start: 2022-07-26 | End: 2022-07-26

## 2022-07-26 RX ORDER — MIDAZOLAM HYDROCHLORIDE 2 MG/2ML
INJECTION, SOLUTION INTRAMUSCULAR; INTRAVENOUS AS NEEDED
Status: DISCONTINUED | OUTPATIENT
Start: 2022-07-26 | End: 2022-07-26

## 2022-07-26 RX ORDER — PROPOFOL 10 MG/ML
INJECTION, EMULSION INTRAVENOUS AS NEEDED
Status: DISCONTINUED | OUTPATIENT
Start: 2022-07-26 | End: 2022-07-26

## 2022-07-26 RX ORDER — GLYCOPYRROLATE 0.2 MG/ML
INJECTION INTRAMUSCULAR; INTRAVENOUS AS NEEDED
Status: DISCONTINUED | OUTPATIENT
Start: 2022-07-26 | End: 2022-07-26

## 2022-07-26 RX ORDER — ONDANSETRON 2 MG/ML
4 INJECTION INTRAMUSCULAR; INTRAVENOUS EVERY 4 HOURS PRN
Status: DISCONTINUED | OUTPATIENT
Start: 2022-07-26 | End: 2022-07-27 | Stop reason: HOSPADM

## 2022-07-26 RX ORDER — OXYCODONE HCL 5 MG/5 ML
5 SOLUTION, ORAL ORAL EVERY 4 HOURS PRN
Qty: 50 ML | Refills: 0 | Status: SHIPPED | OUTPATIENT
Start: 2022-07-26 | End: 2022-08-05

## 2022-07-26 RX ORDER — ACETAMINOPHEN 325 MG/1
650 TABLET ORAL ONCE
Status: DISCONTINUED | OUTPATIENT
Start: 2022-07-26 | End: 2022-07-26

## 2022-07-26 RX ORDER — DEXAMETHASONE SODIUM PHOSPHATE 4 MG/ML
8 INJECTION, SOLUTION INTRA-ARTICULAR; INTRALESIONAL; INTRAMUSCULAR; INTRAVENOUS; SOFT TISSUE EVERY 8 HOURS SCHEDULED
Status: DISCONTINUED | OUTPATIENT
Start: 2022-07-26 | End: 2022-07-27 | Stop reason: HOSPADM

## 2022-07-26 RX ORDER — LIDOCAINE HYDROCHLORIDE AND EPINEPHRINE 10; 10 MG/ML; UG/ML
INJECTION, SOLUTION INFILTRATION; PERINEURAL AS NEEDED
Status: DISCONTINUED | OUTPATIENT
Start: 2022-07-26 | End: 2022-07-26 | Stop reason: HOSPADM

## 2022-07-26 RX ORDER — SODIUM CHLORIDE, SODIUM LACTATE, POTASSIUM CHLORIDE, CALCIUM CHLORIDE 600; 310; 30; 20 MG/100ML; MG/100ML; MG/100ML; MG/100ML
INJECTION, SOLUTION INTRAVENOUS CONTINUOUS PRN
Status: DISCONTINUED | OUTPATIENT
Start: 2022-07-26 | End: 2022-07-26

## 2022-07-26 RX ORDER — DEXMEDETOMIDINE HYDROCHLORIDE 100 UG/ML
INJECTION, SOLUTION INTRAVENOUS AS NEEDED
Status: DISCONTINUED | OUTPATIENT
Start: 2022-07-26 | End: 2022-07-26

## 2022-07-26 RX ORDER — CHLORHEXIDINE GLUCONATE 0.12 MG/ML
15 RINSE ORAL EVERY 12 HOURS SCHEDULED
Status: DISCONTINUED | OUTPATIENT
Start: 2022-07-26 | End: 2022-07-26

## 2022-07-26 RX ORDER — CHLORHEXIDINE GLUCONATE 0.12 MG/ML
15 RINSE ORAL EVERY 12 HOURS SCHEDULED
Status: DISCONTINUED | OUTPATIENT
Start: 2022-07-26 | End: 2022-07-27 | Stop reason: HOSPADM

## 2022-07-26 RX ORDER — ROCURONIUM BROMIDE 10 MG/ML
INJECTION, SOLUTION INTRAVENOUS AS NEEDED
Status: DISCONTINUED | OUTPATIENT
Start: 2022-07-26 | End: 2022-07-26

## 2022-07-26 RX ORDER — CHLORHEXIDINE GLUCONATE 0.12 MG/ML
15 RINSE ORAL EVERY 12 HOURS SCHEDULED
Qty: 120 ML | Refills: 0 | Status: SHIPPED | OUTPATIENT
Start: 2022-07-26 | End: 2022-08-02

## 2022-07-26 RX ORDER — FENTANYL CITRATE 50 UG/ML
INJECTION, SOLUTION INTRAMUSCULAR; INTRAVENOUS AS NEEDED
Status: DISCONTINUED | OUTPATIENT
Start: 2022-07-26 | End: 2022-07-26

## 2022-07-26 RX ORDER — AMOXICILLIN AND CLAVULANATE POTASSIUM 400; 57 MG/5ML; MG/5ML
800 POWDER, FOR SUSPENSION ORAL 2 TIMES DAILY
Qty: 140 ML | Refills: 0 | Status: SHIPPED | OUTPATIENT
Start: 2022-07-26 | End: 2022-08-02

## 2022-07-26 RX ADMIN — OXYCODONE HYDROCHLORIDE 10 MG: 5 SOLUTION ORAL at 20:04

## 2022-07-26 RX ADMIN — KETOROLAC TROMETHAMINE 15 MG: 30 INJECTION, SOLUTION INTRAMUSCULAR at 18:41

## 2022-07-26 RX ADMIN — METHOCARBAMOL 500 MG: 500 TABLET ORAL at 13:24

## 2022-07-26 RX ADMIN — SODIUM CHLORIDE 3 G: 9 INJECTION, SOLUTION INTRAVENOUS at 09:42

## 2022-07-26 RX ADMIN — KETOROLAC TROMETHAMINE 15 MG: 30 INJECTION, SOLUTION INTRAMUSCULAR at 06:23

## 2022-07-26 RX ADMIN — REMIFENTANIL HYDROCHLORIDE 0.15 MCG/KG/MIN: 1 INJECTION, POWDER, LYOPHILIZED, FOR SOLUTION INTRAVENOUS at 16:26

## 2022-07-26 RX ADMIN — SODIUM CHLORIDE, SODIUM LACTATE, POTASSIUM CHLORIDE, AND CALCIUM CHLORIDE: .6; .31; .03; .02 INJECTION, SOLUTION INTRAVENOUS at 16:13

## 2022-07-26 RX ADMIN — PROPOFOL 200 MG: 10 INJECTION, EMULSION INTRAVENOUS at 16:18

## 2022-07-26 RX ADMIN — LIDOCAINE HYDROCHLORIDE 50 MG: 10 INJECTION, SOLUTION EPIDURAL; INFILTRATION; INTRACAUDAL at 16:18

## 2022-07-26 RX ADMIN — SUGAMMADEX 200 MG: 100 INJECTION, SOLUTION INTRAVENOUS at 16:43

## 2022-07-26 RX ADMIN — ONDANSETRON 4 MG: 2 INJECTION INTRAMUSCULAR; INTRAVENOUS at 16:28

## 2022-07-26 RX ADMIN — METHOCARBAMOL 500 MG: 500 TABLET ORAL at 18:41

## 2022-07-26 RX ADMIN — DEXMEDETOMIDINE HYDROCHLORIDE 12 MCG: 100 INJECTION, SOLUTION INTRAVENOUS at 16:29

## 2022-07-26 RX ADMIN — DEXAMETHASONE SODIUM PHOSPHATE 10 MG: 10 INJECTION, SOLUTION INTRAMUSCULAR; INTRAVENOUS at 16:28

## 2022-07-26 RX ADMIN — GLYCOPYRROLATE 0.2 MG: 0.2 INJECTION, SOLUTION INTRAMUSCULAR; INTRAVENOUS at 16:15

## 2022-07-26 RX ADMIN — FENTANYL CITRATE 50 MCG: 50 INJECTION INTRAMUSCULAR; INTRAVENOUS at 16:18

## 2022-07-26 RX ADMIN — PHENYLEPHRINE HYDROCHLORIDE 4 DROP: 10 SPRAY NASAL at 16:13

## 2022-07-26 RX ADMIN — KETOROLAC TROMETHAMINE 15 MG: 30 INJECTION, SOLUTION INTRAMUSCULAR at 13:31

## 2022-07-26 RX ADMIN — FENTANYL CITRATE 50 MCG: 50 INJECTION INTRAMUSCULAR; INTRAVENOUS at 16:25

## 2022-07-26 RX ADMIN — IBUPROFEN 800 MG: 100 SUSPENSION ORAL at 00:37

## 2022-07-26 RX ADMIN — SODIUM CHLORIDE 3 G: 9 INJECTION, SOLUTION INTRAVENOUS at 20:17

## 2022-07-26 RX ADMIN — ACETAMINOPHEN 975 MG: 650 SUSPENSION ORAL at 20:05

## 2022-07-26 RX ADMIN — ROCURONIUM BROMIDE 50 MG: 10 SOLUTION INTRAVENOUS at 16:18

## 2022-07-26 RX ADMIN — ACETAMINOPHEN 650 MG: 650 SUSPENSION ORAL at 00:36

## 2022-07-26 RX ADMIN — HYDROMORPHONE HYDROCHLORIDE 0.5 MG: 1 INJECTION, SOLUTION INTRAMUSCULAR; INTRAVENOUS; SUBCUTANEOUS at 22:05

## 2022-07-26 RX ADMIN — CHLORHEXIDINE GLUCONATE 15 ML: 1.2 RINSE ORAL at 16:00

## 2022-07-26 RX ADMIN — MIDAZOLAM 2 MG: 1 INJECTION INTRAMUSCULAR; INTRAVENOUS at 16:12

## 2022-07-26 RX ADMIN — DEXAMETHASONE SODIUM PHOSPHATE 8 MG: 4 INJECTION INTRA-ARTICULAR; INTRALESIONAL; INTRAMUSCULAR; INTRAVENOUS; SOFT TISSUE at 09:43

## 2022-07-26 RX ADMIN — FENTANYL CITRATE 25 MCG: 50 INJECTION INTRAMUSCULAR; INTRAVENOUS at 17:50

## 2022-07-26 RX ADMIN — ACETAMINOPHEN 975 MG: 650 SUSPENSION ORAL at 13:24

## 2022-07-26 RX ADMIN — CHLORHEXIDINE GLUCONATE 15 ML: 1.2 RINSE ORAL at 20:05

## 2022-07-26 RX ADMIN — FENTANYL CITRATE 25 MCG: 50 INJECTION INTRAMUSCULAR; INTRAVENOUS at 17:58

## 2022-07-26 RX ADMIN — CHLORHEXIDINE GLUCONATE 15 ML: 1.2 RINSE ORAL at 09:41

## 2022-07-26 NOTE — DISCHARGE SUMMARY
[facility]  Discharge- [address] 1995, 27 y o  male MRN: [id number]  Unit/Bed#: S -01 Encounter: [id number]  Primary Care Provider: No primary care provider on file  Date and time admitted to hospital: 7/25/2022 11:43 PM    No new Assessment & Plan notes have been filed under this hospital service since the last note was generated  Service: Surgery-General            Medical Problems             Resolved Problems  Date Reviewed: 7/26/2022   None                 Admission Date:   Admission Orders (From admission, onward)     Ordered        07/26/22 0445  Place in Observation  Once                        Admitting Diagnosis: [facility] mandible[facility] [S02 650A]  Injury of jaw [S09 93XA]    HPI: per Acacia Antunez:  "[address] is a 27 y o  male with PMH left mandibular fracture who presents with jaw pain following a punch to the jaw while sparing  He reports upon taking a hit to the face he had immediate jaw pain and malocclusion similar to his previous jaw fracture  He denies LOC, headache, blurry vision, abdominal or chest pain, n/v, or SOB  "    Procedures Performed: No orders of the defined types were placed in this encounter  Summary of Hospital Course: 28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, 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  Taken to OR with OMFS for fixation and jaw wired  Wire cutters at bedside and use explained to patient  Pain controlled, taking in ENsures and liquids  Wants to go home after third dose of steroir ordered by OMFS  PLanning on going to work tomorrow, discussed his discharge care, etc   [address] with Oxy elixir  Patient discharged and will follow up with OMFS  Significant Findings, Care, Treatment and Services Provided: CT facial bones without contrast    Result Date: 7/26/2022  Impression: Acute nondisplaced fracture involving the angle of the right mandible  Intact TMJ joints   Workstation performed: [id number]         Complications: none    Condition at Discharge: stable         Discharge instructions/Information to patient and family:   See after visit summary for information provided to patient and family  Provisions for Follow-Up Care:  See after visit summary for information related to follow-up care and any pertinent home health orders  PCP: No primary care provider on file  Disposition: Home    Planned Readmission: No    Discharge Statement   I spent 30 minutes discharging the patient  This time was spent on the day of discharge  I had direct contact with the patient on the day of discharge  Additional documentation is required if more than 30 minutes were spent on discharge  Discharge Medications:  See after visit summary for reconciled discharge medications provided to patient and family

## 2022-07-26 NOTE — ED PROVIDER NOTES
History  Chief Complaint   Patient presents with    Jaw Injury     Pt got hit in face with boxing glove on R side of jaw, heard a crack, unable to open mouth all of the way     Louisa Faulkner is a 27year old male presenting for evaluation of an injury to the right jaw while boxing/sparring  Patient was struck on the right side of his face, he felt immediate pain  He endorses swelling along his right jaw, moderate pain when opening his mouth and with swallowing  Patient has a prior history of mandibular fracture, he is concerned he has another fracture today  He denies any other injuries  He took 400 mg of Motrin prior to arrival and noted minimal improvement of his pain  History provided by:  Patient   used: No        Prior to Admission Medications   Prescriptions Last Dose Informant Patient Reported? Taking?   oxyCODONE-acetaminophen (PERCOCET) 5-325 mg per tablet   No No   Sig: Take 1 tablet by mouth every 6 (six) hours as needed for moderate pain for up to 8 dosesMax Daily Amount: 4 tablets      Facility-Administered Medications: None       Past Medical History:   Diagnosis Date    Hand fracture     3 weeks ago       Past Surgical History:   Procedure Laterality Date    ORIF MANDIBULAR FRACTURE Left 10/17/2018    Procedure: OPEN REDUCTION W/ INTERNAL FIXATION (ORIF) MANDIBULAR FRACTURE;  Surgeon: Dale Fernandez DMD;  [facility];  Service: Maxillofacial    REMOVAL OF IMPACTED TOOTH - COMPLETELY BONY Left 10/17/2018    Procedure: EXTRACTION TEETH MULTIPLE, #16, 17;  Surgeon: Dale Fernandez DMD;  [facility];  Service: Maxillofacial       History reviewed  No pertinent family history  I have reviewed and agree with the history as documented      E-Cigarette/Vaping    E-Cigarette Use Never User      E-Cigarette/Vaping Substances     Social History     Tobacco Use    Smoking status: Never Smoker    Smokeless tobacco: Never Used   Vaping Use    Vaping Use: Never used Substance Use Topics    Alcohol use: No    Drug use: No        Review of Systems   Constitutional: Negative for chills and fever  HENT: Negative for ear pain and sore throat  Injury to right jaw   Eyes: Negative for pain and visual disturbance  Respiratory: Negative for cough and shortness of breath  Cardiovascular: Negative for chest pain and palpitations  Gastrointestinal: Negative for abdominal pain and vomiting  Genitourinary: Negative for dysuria and hematuria  Musculoskeletal: Negative for arthralgias and back pain  Skin: Negative for color change and rash  Neurological: Negative for seizures and syncope  All other systems reviewed and are negative  Physical Exam  ED Triage Vitals   Temperature Pulse Respirations Blood Pressure SpO2   07/25/22 2346 07/25/22 2345 07/25/22 2345 07/25/22 2345 07/25/22 2345   99 4 °F (37 4 °C) 57 16 139/82 99 %      Temp Source Heart Rate Source Patient Position - Orthostatic VS BP Location FiO2 (%)   07/25/22 2346 07/25/22 2345 07/25/22 2345 07/25/22 2345 --   Oral Monitor Sitting Right arm       Pain Score       07/25/22 2345       9             Orthostatic Vital Signs  Vitals:    07/25/22 2345 07/26/22 0533 07/26/22 0622   BP: 139/82 118/77 114/60   Pulse: 57 (!) 47 (!) 40   Patient Position - Orthostatic VS: Sitting Sitting        Physical Exam  Vitals and nursing note reviewed  Constitutional:       General: He is not in acute distress  HENT:      Head: Normocephalic  No raccoon eyes, Espinal's sign, right periorbital erythema or left periorbital erythema  Jaw: Tenderness, swelling and pain on movement present  Comments: Patient has tenderness to palpation of the angle of his right mandible, he has swelling in this area  Patient has decreased range of motion when opening his mouth, he notes pain on movement of his jaw       Right Ear: External ear normal       Left Ear: External ear normal       Mouth/Throat:      Mouth: Mucous membranes are moist       Pharynx: Oropharynx is clear  Eyes:      General: No scleral icterus  Conjunctiva/sclera: Conjunctivae normal    Cardiovascular:      Rate and Rhythm: Normal rate and regular rhythm  Pulses: Normal pulses  Heart sounds: Normal heart sounds  Pulmonary:      Effort: Pulmonary effort is normal  No respiratory distress  Breath sounds: Normal breath sounds  Abdominal:      General: Abdomen is flat  There is no distension  Tenderness: There is no abdominal tenderness  Musculoskeletal:         General: No tenderness or signs of injury  Cervical back: Neck supple  No rigidity  Skin:     General: Skin is warm  Coloration: Skin is not jaundiced  Findings: No erythema or rash  Neurological:      General: No focal deficit present  Mental Status: He is alert  Mental status is at baseline     Psychiatric:         Mood and Affect: Mood normal          Behavior: Behavior normal          ED Medications  Medications   ondansetron (ZOFRAN) injection 4 mg (has no administration in time range)   enoxaparin (LOVENOX) subcutaneous injection 30 mg (0 mg Subcutaneous Hold 7/26/22 0622)   acetaminophen (TYLENOL) oral suspension 975 mg (975 mg Oral Refused 7/26/22 0622)   oxyCODONE (ROXICODONE) oral solution 5 mg (has no administration in time range)   HYDROmorphone (DILAUDID) injection 0 5 mg (has no administration in time range)   oxyCODONE (ROXICODONE) oral solution 10 mg (has no administration in time range)   ketorolac (TORADOL) injection 15 mg (15 mg Intravenous Given 7/26/22 0623)   methocarbamol (ROBAXIN) tablet 500 mg (500 mg Oral Not Given 7/26/22 0623)   acetaminophen (TYLENOL) oral suspension 650 mg (650 mg Oral Given 7/26/22 0036)   ibuprofen (MOTRIN) oral suspension 800 mg (800 mg Oral Given 7/26/22 0037)       Diagnostic Studies  Results Reviewed     Procedure Component Value Units Date/Time    Basic metabolic panel [[id number]] Collected: 07/26/22 0532    Lab Status: Final result Specimen: Blood from Arm, Right Updated: 07/26/22 0619     Sodium 138 mmol/L      Potassium 3 6 mmol/L      Chloride 105 mmol/L      CO2 28 mmol/L      ANION GAP 5 mmol/L      BUN 13 mg/dL      Creatinine 1 22 mg/dL      Glucose 87 mg/dL      Calcium 9 2 mg/dL      eGFR 80 ml/min/1 73sq m     Narrative:      [place] guidelines for Chronic Kidney Disease (CKD):     Stage 1 with normal or high GFR (GFR > 90 mL/min/1 73 square meters)    Stage 2 Mild CKD (GFR = 60-89 mL/min/1 73 square meters)    Stage 3A Moderate CKD (GFR = 45-59 mL/min/1 73 square meters)    Stage 3B Moderate CKD (GFR = 30-44 mL/min/1 73 square meters)    Stage 4 Severe CKD (GFR = 15-29 mL/min/1 73 square meters)    Stage 5 End Stage CKD (GFR <15 mL/min/1 73 square meters)  Note: GFR calculation is accurate only with a steady state creatinine    CBC and Platelet [[id number]]  (Normal) Collected: 07/26/22 0532    Lab Status: Final result Specimen: Blood from Arm, Right Updated: 07/26/22 0550     WBC 7 24 Thousand/uL      RBC 4 69 Million/uL      Hemoglobin 13 9 g/dL      Hematocrit 41 2 %      MCV 88 fL      MCH 29 6 pg      MCHC 33 7 g/dL      RDW 13 4 %      Platelets 688 Thousands/uL      MPV 9 9 fL                  CT facial bones without contrast   Final Result by Neftaly Johnston MD (07/26 6159)      Acute nondisplaced fracture involving the angle of the right mandible  Intact TMJ joints  Workstation performed: [id number]               Procedures  Procedures      ED Course                                       MDM  Number of Diagnoses or Management Options  Risk of Complications, Morbidity, and/or Mortality  General comments: Dawson Arce is a 27year old male presenting for evaluation of an injury to the right jaw while boxing/sparring  Patient is concerned that he has a fracture, he has a history of mandibular fracture      On physical exam, the patient had tenderness and swelling at the angle of the right mandible  CT scan showed a nondisplaced fracture at the angle of the right mandible  I contacted the trauma team who evaluated the patient and admitted to their service  Patient Progress  Patient progress: stable      Disposition  Final diagnoses:   Closed fracture of angle of mandible[facility]     Time reflects when diagnosis was documented in both MDM as applicable and the Disposition within this note     Time User Action Codes Description Comment    7/26/2022  4:42 AM Jessica Carlin Add [S02 650A] Closed fracture of angle of mandible, initial encounter [facility])       ED Disposition     None      Follow-up Information    None         Current Discharge Medication List      CONTINUE these medications which have NOT CHANGED    Details   oxyCODONE-acetaminophen (PERCOCET) 5-325 mg per tablet Take 1 tablet by mouth every 6 (six) hours as needed for moderate pain for up to 8 dosesMax Daily Amount: 4 tablets  Qty: 8 tablet, Refills: 0    Associated Diagnoses: Nondisplaced fracture of shaft of fourth metacarpal bone, left hand, initial encounter for closed fracture           No discharge procedures on file  PDMP Review     None           ED Provider  Attending physically available and evaluated Fritz Lucero  I managed the patient along with the ED Attending      Electronically Signed by         Annalise Mcdonough DO  07/26/22 1407

## 2022-07-26 NOTE — DISCHARGE INSTRUCTIONS
MANDIBLE FRACTURE POST-OPERATIVE INSTRUCTIONS    Antibiotics: After discharge from the office or hospital the patient is given prescriptions for antibiotic medication usually in liquid form or to be crushed and placed in liquid  It is VERY IMPORTANT to take these medications after surgery for the prevention of infection  Given the extensive variety of bacteria in the oral cavity the antibiotic regimen usually lasts for 7 days  If the patient develops a yellow or green discharge from the fracture site they must be seen at the office on the next business day or the oral surgery staff must be contacted for evaluation and adjustment of antibiotics  Not doing so may result in A life-threatening condition  Pain Control: After surgery patients usually require narcotic pain medications for 7 days  Prescriptions are provided for they usually in liquid form  Severe pain lasting longer than 7 days may indicate a serious problem and the patient needs to be evaluated on the next business day or the oral surgery staff contacted  Chronic pain may continue throughout the 6-8 weeks of fixation due to muscular degeneration and spasm  Swelling: Swelling is normal after mandible surgery  The patient may be given prescriptions for steroids after surgery to aid in limiting the facial swelling  The swelling will be most noticeable on the 3rd and 4th day after surgery  If the swelling has not decreased after 6-7 days the patient should return to the office for an evaluation and contact the oral surgery staff  Numbness: Numbness may persist permanently after surgery  Often this numbness is attributable to the initial injury  The patient will be evaluated weekly for resolution of the numbness which may often be slow to progress  This numbness may change over time to a "pins and needles" or "burning and itching" feeling  Please let the oral surgery staff know at the next office visit which these changes occur      Diet: It is important to maintain a liquid or soft diet after fracture surgery  The plates and screws that may have been used are not designed to withstand the forces created by normal chewing and function  It is important to take in a minimum of 2900-5088 calories per day after surgery  This may be accomplished even on a liquid diet with the use of supplements such as Ensure, Slim Fast, Protein shakes, Boost, etc  Decreased calorie intake may lead to a decrease in immune system effectiveness and subsequent infection  A soft diet if recommended is described as nothing harder than the consistency of mashed potatoes  Foods that are included are eggs, soft pastas, pudding, ice cream and obviously liquids and pureed foods  Oral Hygiene: Patients MUST brush their teeth  Good oral hygiene is especially important after repair of a jaw fracture  Poor oral hygiene may lead to infection and failure of the fracture to heal  The patient will also be provided a prescription for an antimicrobial mouth rinse to be used twice a day  Excess use of the mouth wash may result in staining of the teeth  Wound Care: In addition to good oral hygiene, patients often have sutures that should be cared for after surgery  For incisions and sutures of the mouth normal saline rinses should be used 4 times a day  Skin sutures should be kept moist and free of scabbing for 5-7 days using bacitracin or antibiotic ointment  Failure to do so may result in significant scarring  Wire and Elastics: Except for specific cases, wires and/or elastics are used for fixation of the fractures for a minimum of 6-8 weeks  These wires and elastics must remain tight for the fracture to heal  If the elastics or wires are removed or come off the patient, they must be seen on the next business day for re-application  Failure to do so may result in improper healing or unhealed fractures and infection      External Fixation: In some cases external devices are used to secure jaw fractures  These devices also require care including dressing changes around the pin structures daily and antibiotic ointment to the skin around the pins  A full liquid diet should be maintained while the device is in place  Should the device become loose in any way the patient must be seen in the office on the next business day  Failure to do so may result in complete failure of the appliance and improper healing  Follow up Visit: The patient needs to be seen one seek after surgery  Appointments are then made in 1 week intervals unless determined on a cases by case basis  Failure to keep follow up appointments prevents the oral surgery staff from ensuring good care for the patient may lead to any number of the above mentioned problems often avoidable by routine visits  Concerns: May call Katherin Arellano for Oral Surgery and Implantology at [phone]  Emergency: Go to the [address] at [facility] and ask for the Oral Surgeon on call  DO NOT WAIT until your post-operative appointment to consult [facility] [phone]

## 2022-07-26 NOTE — CONSULTS
Oral and Maxillofacial Surgery Consult    Patient Seen Date: 07/26/22 7:07 AM       HPI: Pt is 27 y o  male PMHx of previous jaw fx  Consult requested by primary team for evaluation of jaw fracture  Patient reports jaw pain following punch to jaw while sparing  He reports upon taking a hit to the face he had immediate jaw pain and malocclusion similar to his previous jaw fracture  He denies LOC, headache, blurry vision, abdominal or chest pain, n/v, or SOB      --------------------------------------------------------------------------------------------------------------------------------------  Plan:  - OR for ORIF of R mandible fx via intraoral and/or extraoral approach, placement of IMF and extraction of any necessary teeth  - Antibiotics: Unasyn 3g IV TID and discharge on Augmentin 875/125mg PO BID for total of 7 days  - Pain Control: Analgesia as per Primary Team   - Decadron 8mg IV q8h for 3 doses  - Diet: NPO now  Plan for OR add on today  - Peridex 15mL swish and spit BID x7d  - Encourage good oral hygiene  - head of bed elevated  - ice to affected area as needed  - F/U: Follow up in clinic within 1-2 weeks of discharge , Patient should see general dentist for general oral care       D/w OMFS attg on call  --------------------------------------------------------------------------------------------------------------------------------------    PMH:   Past Medical History:   Diagnosis Date    Hand fracture     3 weeks ago        Allergies:   No Known Allergies    Meds:     Current Facility-Administered Medications:     acetaminophen (TYLENOL) oral suspension 975 mg, 975 mg, Oral, Q8H, Selene Carlin PA-C    enoxaparin (LOVENOX) subcutaneous injection 30 mg, 30 mg, Subcutaneous, Q12H, Selene Carlin PA-C    HYDROmorphone (DILAUDID) injection 0 5 mg, 0 5 mg, Intravenous, Q1H PRN, Selene Carlin PA-C    ketorolac (TORADOL) injection 15 mg, 15 mg, Intravenous, Q6H [facility], Selene Carlin PA-C, 15 mg at 07/26/22 0299    methocarbamol (ROBAXIN) tablet 500 mg, 500 mg, Oral, Q6H [address], Selene Carlin PA-C    ondansetron (ZOFRAN) injection 4 mg, 4 mg, Intravenous, Q4H PRN, Selene Carlin PA-C    oxyCODONE (ROXICODONE) oral solution 10 mg, 10 mg, Oral, Q4H PRN, Selene Carlin PA-C    oxyCODONE (ROXICODONE) oral solution 5 mg, 5 mg, Oral, Q4H PRN, Zeus Dominique PA-C    PSH:   Past Surgical History:   Procedure Laterality Date    ORIF MANDIBULAR FRACTURE Left 10/17/2018    Procedure: OPEN REDUCTION W/ INTERNAL FIXATION (ORIF) MANDIBULAR FRACTURE;  Surgeon: Nehemiah Cobos DMD;  [facility];  Service: Maxillofacial    REMOVAL OF IMPACTED TOOTH - COMPLETELY BONY Left 10/17/2018    Procedure: EXTRACTION TEETH MULTIPLE, #16, 17;  Surgeon: Nehemiah Cobos DMD;  [facility];  Service: Maxillofacial      History reviewed  No pertinent family history  Review of Systems   Constitutional: Negative  HENT: Positive for facial swelling (R angle of mandible)  Negative for dental problem  Eyes: Negative  Respiratory: Negative  Cardiovascular: Negative  Gastrointestinal: Negative  Endocrine: Negative  Skin: Negative  Allergic/Immunologic: Negative  Neurological: Negative  Psychiatric/Behavioral: Negative  All other systems reviewed and are negative  Temp:  [98 1 °F (36 7 °C)-99 4 °F (37 4 °C)] 98 1 °F (36 7 °C)  HR:  [40-57] 40  Resp:  [16-20] 16  BP: (114-139)/(60-82) 114/60  SpO2:  [99 %] 99 %     No intake or output data in the 24 hours ending 07/26/22 0707       Physical Exam:  Gen: AAOx3  NAD  Resp: Unlabored on RA  Neuro: bilateralCN V2-V3 Grossly Intact  bilateral CN VII grossly intact  Head: Grossly normal , minimal Facial Swelling a/w R angle of mandible , No bony step-off palpated , TTP at R angle of jaw  No LAD  Negative Trismus  negative  Guarding  Inferior border of the mandible is palpable    Eyes: Grossly Normal , EOMI  , PERRLA No changes to vision, diplopia, exophthalmos, enophthalmos  Ears: Grossly Normal  noblood visualized in the EAC  Denieschanges in hearing  Nose: Grossly Normal, Nares clear and dry  Intraoral: SUMA limited  Occlusion unstable with R sided posterior open bite of 1-2mm  Teeth WNL/No changes  , No laceration present , No vestibular swelling  and No purulence, or draining fistula  FOM soft, non-elevated, non-tender  Uvula midline  Imaging: I have personally reviewed pertinent reports  and I have personally reviewed pertinent films in PACS      Assessment:  27 y o  male presents with R mandible angle fracture  Based on clinical and radiographic exam patient requires acute surgical intervention  Patient will be placed on the add on schedule for surgery  Timing of surgery is pending  Please obtain up to date pre-op labs  (Including CBC, BMP, and coags)        Inpatient consult to Oral and Maxillofacial Surgery  Consult performed by: Kamran Barraza DMD  Consult ordered by: Dariel Whitt PA-C           Counseling / Coordination of Care  Total floor / unit time spent today 30 minutes  Greater than 50% of total time was spent with the patient and / or family counseling and / or coordination of care  A description of the counseling / coordination of care: description of injury with proposed plan of care  Discussed risks, benefits, and alternatives to treatment plan  All questions were answered  Patient in agreement with plan

## 2022-07-26 NOTE — ED ATTENDING ATTESTATION
7/25/2022  IMelissa MD, saw and evaluated the patient  I have discussed the patient with the resident/non-physician practitioner and agree with the resident's/non-physician practitioner's findings, Plan of Care, and MDM as documented in the resident's/non-physician practitioner's note, except where noted  All available labs and Radiology studies were reviewed  I was present for key portions of any procedure(s) performed by the resident/non-physician practitioner and I was immediately available to provide assistance  At this point I agree with the current assessment done in the Emergency Department  I have conducted an independent evaluation of this patient a history and physical is as follows:    ED Course         Critical Care Time  Procedures    Patient is a 27 yom who presents with jaw pain after boxing  Now with left sided pain  Some swelling  MDM pleasant 27 yom, concern for fx, will image

## 2022-07-26 NOTE — ASSESSMENT & PLAN NOTE
- acute non displaced fracture of the right angle of the mandible POA  - Multimodal pain regimen  - OMFS consultation  - NPO for possible surgical repair today

## 2022-07-26 NOTE — PLAN OF CARE
Problem: PAIN - ADULT  Goal: Verbalizes/displays adequate comfort level or baseline comfort level  Description: Interventions:  - Encourage patient to monitor pain and request assistance  - Assess pain using appropriate pain scale  - Administer analgesics based on type and severity of pain and evaluate response  - Implement non-pharmacological measures as appropriate and evaluate response  - Consider cultural and social influences on pain and pain management  - Notify physician/advanced practitioner if interventions unsuccessful or patient reports new pain  Outcome: Progressing     Problem: INFECTION - ADULT  Goal: Absence or prevention of progression during hospitalization  Description: INTERVENTIONS:  - Assess and monitor for signs and symptoms of infection  - Monitor lab/diagnostic results  - Monitor all insertion sites, i e  indwelling lines, tubes, and drains  - Monitor endotracheal if appropriate and nasal secretions for changes in amount and color  - [place] appropriate cooling/warming therapies per order  - Administer medications as ordered  - Instruct and encourage patient and family to use good hand hygiene technique  - Identify and instruct in appropriate isolation precautions for identified infection/condition  Outcome: Progressing  Goal: Absence of fever/infection during neutropenic period  Description: INTERVENTIONS:  - Monitor WBC    Outcome: Progressing     Problem: SAFETY ADULT  Goal: Patient will remain free of falls  Description: INTERVENTIONS:  - Educate patient/family on patient safety including physical limitations  - Instruct patient to call for assistance with activity   - Consult OT/PT to assist with strengthening/mobility   - Keep Call bell within reach  - Keep bed low and locked with side rails adjusted as appropriate  - Keep care items and personal belongings within reach  - Initiate and maintain comfort rounds  - Make Fall Risk Sign visible to staff  - Offer Toileting every 2 Hours, in advance of need  - Apply yellow socks and bracelet for high fall risk patients  - Consider moving patient to room near nurses station  Outcome: Progressing  Goal: Maintain or return to baseline ADL function  Description: INTERVENTIONS:  -  Assess patient's ability to carry out ADLs; assess patient's baseline for ADL function and identify physical deficits which impact ability to perform ADLs (bathing, care of mouth/teeth, toileting, grooming, dressing, etc )  - Assess/evaluate cause of self-care deficits   - Assess range of motion  - Assess patient's mobility; develop plan if impaired  - Assess patient's need for assistive devices and provide as appropriate  - Encourage maximum independence but intervene and supervise when necessary  - Involve family in performance of ADLs  - Assess for home care needs following discharge   - Consider OT consult to assist with ADL evaluation and planning for discharge  - Provide patient education as appropriate  Outcome: Progressing  Goal: Maintains/Returns to pre admission functional level  Description: INTERVENTIONS:  - Perform BMAT or MOVE assessment daily    - Set and communicate daily mobility goal to care team and patient/family/caregiver  - Collaborate with rehabilitation services on mobility goals if consulted  - Perform Range of Motion 4 times a day  - Reposition patient every 2 hours    - Dangle patient 4 times a day  - Stand patient 4 times a day  - Ambulate patient 4 times a day  - Out of bed to chair 3 times a day   - Out of bed for meals 3 times a day  - Out of bed for toileting  - Record patient progress and toleration of activity level   Outcome: Progressing     Problem: DISCHARGE PLANNING  Goal: Discharge to home or other facility with appropriate resources  Description: INTERVENTIONS:  - Identify barriers to discharge w/patient and caregiver  - Arrange for needed discharge resources and transportation as appropriate  - Identify discharge learning needs (meds, wound care, etc )  - Arrange for interpretive services to assist at discharge as needed  - Refer to Case Management Department for coordinating discharge planning if the patient needs post-hospital services based on physician/advanced practitioner order or complex needs related to functional status, cognitive ability, or social support system  Outcome: Progressing     Problem: Knowledge Deficit  Goal: Patient/family/caregiver demonstrates understanding of disease process, treatment plan, medications, and discharge instructions  Description: Complete learning assessment and assess knowledge base    Interventions:  - Provide teaching at level of understanding  - Provide teaching via preferred learning methods  Outcome: Progressing

## 2022-07-26 NOTE — ANESTHESIA POSTPROCEDURE EVALUATION
Post-Op Assessment Note    CV Status:  Stable    Pain management: adequate     Mental Status:  Awake   Hydration Status:  Euvolemic   PONV Controlled:  Controlled   Airway Patency:  Patent      Post Op Vitals Reviewed: Yes      Staff: Anesthesiologist, CRNA         No complications documented      BP      Temp      Pulse     Resp      SpO2

## 2022-07-26 NOTE — UTILIZATION REVIEW
Initial Clinical Review    Admission: Date/Time/Statement:   Admission Orders (From admission, onward)     Ordered        07/26/22 0445  Place in Observation  Once                      Orders Placed This Encounter   Procedures    Place in Observation     Standing Status:   Standing     Number of Occurrences:   1     Order Specific Question:   Level of Care     Answer:   Med Surg [16]     Order Specific Question:   Bed Type     Answer:   Trauma [7]     ED Arrival Information     Expected   -    Arrival   7/25/2022 23:32    Acuity   Urgent            Means of arrival   Walk-In    Escorted by   Self    Service   Trauma    Admission type   Urgent            Arrival complaint   Jaw Pain           Chief Complaint   Patient presents with    Jaw Injury     Pt got hit in face with boxing glove on R side of jaw, heard a crack, unable to open mouth all of the way       Initial Presentation: 27 y o  male urgently presents self to ED as Observation admission for eval & treatment of closed FX of angle of mandible  PMH L mandibular FX Reports w jaw pain following a punch while sparing; took a hit to face w immediate jaw pain & malocclusion similar to previous jaw FX   Assessment & Plan  - acute non displaced fracture of the right angle of the mandible POA  - Multimodal pain regimen  - OMFS consultation  - NPO for possible surgical repair today  Date: 7/26   Day 2:   OMFS   OR for ORIF of R mandible fx via intraoral and/or extraoral approach, placement of IMF and extraction of any necessary teeth  - Antibiotics: Unasyn 3g IV TID and discharge on Augmentin 875/125mg PO BID for total of 7 days  - Pain Control: Analgesia as per Primary Team   - Decadron 8mg IV q8h for 3 doses  - Diet: NPO now   Plan for OR add on today  - Peridex 15mL swish and spit BID x7d  - Encourage good oral hygiene  - head of bed elevated  - ice to affected area as needed    ED Triage Vitals   Temperature Pulse Respirations Blood Pressure SpO2   07/25/22 2346 07/25/22 2345 07/25/22 2345 07/25/22 2345 07/25/22 2345   99 4 °F (37 4 °C) 57 16 139/82 99 %      Temp Source Heart Rate Source Patient Position - Orthostatic VS BP Location FiO2 (%)   07/25/22 2346 07/25/22 2345 07/25/22 2345 07/25/22 2345 --   Oral Monitor Sitting Right arm       Pain Score       07/25/22 2345       9          Wt Readings from Last 1 Encounters:   11/23/20 81 6 kg (180 lb)     Additional Vital Signs:   Date/Time Temp Pulse Resp BP MAP (mmHg) SpO2 O2 Device Patient Position - Orthostatic VS   07/26/22 07:17:01 98 3 °F (36 8 °C) 49 Abnormal  16 116/69 85 97 % -- --   07/26/22 06:22:23 98 1 °F (36 7 °C) 40 Abnormal  -- 114/60 78 99 % -- --   07/26/22 0533 -- 47 Abnormal  16 118/77 -- 99 % None (Room air) Sitting   07/25/22 2346 99 4 °F (37 4 °C) -- -- -- -- -- -- --   07/25/22 2345 -- 57 16 139/82 102 99 % None (Room air) Sitting       Pertinent Labs/Diagnostic Test Results:   CT facial bones without contrast   Final Result by Andrew Staples MD (07/26 3263)      Acute nondisplaced fracture involving the angle of the right mandible  Intact TMJ joints                 Workstation performed: [id number]               Results from last 7 days   Lab Units 07/26/22  0532   WBC Thousand/uL 7 24   HEMOGLOBIN g/dL 13 9   HEMATOCRIT % 41 2   PLATELETS Thousands/uL 217         Results from last 7 days   Lab Units 07/26/22  0532   SODIUM mmol/L 138   POTASSIUM mmol/L 3 6   CHLORIDE mmol/L 105   CO2 mmol/L 28   ANION GAP mmol/L 5   BUN mg/dL 13   CREATININE mg/dL 1 22   EGFR ml/min/1 73sq m 80   CALCIUM mg/dL 9 2             Results from last 7 days   Lab Units 07/26/22  0532   GLUCOSE RANDOM mg/dL 87             No results found for: BETA-HYDROXYBUTYRATE                                                                                                                             ED Treatment:   Medication Administration from 07/25/2022 2332 to 07/26/2022 3627       Date/Time Order Dose Route Action 07/26/2022 0036 acetaminophen (TYLENOL) oral suspension 650 mg 650 mg Oral Given     07/26/2022 0037 ibuprofen (MOTRIN) oral suspension 800 mg 800 mg Oral Given        Past Medical History:   Diagnosis Date    Hand fracture     3 weeks ago     Present on Admission:   Closed fracture of angle of mandible, initial encounter [facility])      Admitting Diagnosis: Closed fracture of angle of mandible, initial encounter ([facility] [address] ) [S02 650A]  Injury of jaw [S09 93XA]  Age/Sex: 27 y o  male  Admission Orders:  NPO    Scheduled Medications:  acetaminophen, 975 mg, Oral, Q8H  ampicillin-sulbactam, 3 g, Intravenous, Q8H  chlorhexidine, 15 mL, Swish & Spit, Q12H [address]  dexamethasone, 8 mg, Intravenous, Q8H CHRISSY  enoxaparin, 30 mg, Subcutaneous, Q12H  ketorolac, 15 mg, Intravenous, Q6H CHRISSY  methocarbamol, 500 mg, Oral, Q6H [address]      Continuous IV Infusions:     PRN Meds:  HYDROmorphone, 0 5 mg, Intravenous, Q1H PRN  ondansetron, 4 mg, Intravenous, Q4H PRN  oxyCODONE, 10 mg, Oral, Q4H PRN  oxyCODONE, 5 mg, Oral, Q4H PRN        IP CONSULT TO ORAL AND MAXILLOFACIAL SURGERY    Network Utilization Review Department  ATTENTION: Please call with any questions or concerns to [phone] and carefully listen to the prompts so that you are directed to the right person  All voicemails are confidential   Pippa Dominique all requests for admission clinical reviews, approved or denied determinations and any other requests to dedicated fax number below belonging to the campus where the patient is receiving treatment   List of dedicated fax numbers for the Facilities:  [address] DENIALS (Administrative/Medical Necessity) [phone]   [address] (Maternity/NICU/Pediatrics) [phone]   [address] [place] [address], [facility] [address] [id number] [place] [address] [address] [facility] [place] [address] [phone]

## 2022-07-26 NOTE — ANESTHESIA PREPROCEDURE EVALUATION
Procedure:  OPEN REDUCTION W/ INTERNAL FIXATION (ORIF) MANDIBULAR FRACTURE (Right Mouth)    Relevant Problems   No relevant active problems        Physical Exam    Airway       Dental       Cardiovascular  Rate: normal,     Pulmonary  Pulmonary exam normal     Other Findings  Mouth opening limited due to pain      Anesthesia Plan  ASA Score- 1     Anesthesia Type- general with ASA Monitors  Additional Monitors:   Airway Plan: ETT  Comment: Nasal tube  Plan Factors-    Chart reviewed  EKG reviewed  Existing labs reviewed  Patient summary reviewed  Induction- intravenous  Postoperative Plan-     Informed Consent- Anesthetic plan and risks discussed with patient  I personally reviewed this patient with the CRNA  Discussed and agreed on the Anesthesia Plan with the CRNA  Jorge Adan

## 2022-07-26 NOTE — OP NOTE
OPERATIVE REPORT  PATIENT NAME: Sue Duffy    :  1995  MRN: [id number]  Pt Location: AN OR ROOM 01    SURGERY DATE: 2022    Surgeon(s) and Role:     * Penny Hermosillo, CALVIN - Primary     * Pipo Washington DMD - Assisting  The intricacies of the ORIF mandibular fracture procedure are such that a second surgeon is required in order to perform the procedure safely and be held to the standard of care for that oral and maxillofacial surgery  The second surgeon was used to establish proper reduction of fracture, alignment of fracture, and proper anatomical reduction  In addition, this results in shorter surgical time, better outcome, decrease infection rate, and amelioration of surgical complications  Preop Diagnosis:  Open fracture of right mandibular angle, initial encounter ([place] [address] ) [S02 651B]    Post-Op Diagnosis Codes:     * Open fracture of right mandibular angle, initial encounter ([place] [address] ) [S02 651B]    Procedure(s) (LRB):  OPEN REDUCTION W/ INTERNAL FIXATION (ORIF) MANDIBULAR FRACTURE (Right)    Specimen(s):  * No specimens in log *    Estimated Blood Loss:   Minimal    Drains:  * No LDAs found *    Anesthesia Type:   General    Operative Indications:  Open fracture of right mandibular angle, initial encounter ([place] [address] ) [S02 651B]  Non-restorable, carious teeth 3 and 14    Operative Findings:  Carious teeth 3 and 14, tooth #32 in line of fracture but does not interfere with reduction    Complications:   None    Procedure and Technique:  The patient was greeted in the preoperative area  All the risks and benefits of the procedure were once again explained and the risks of malocclusion, nonunion, malunion, pain ,bleeding, infection, swelling, permanent nerve dysfunction including lower chin and lip numbness were explained in detail all questions were answered  Consent had already been signed   Care was then handed back to the anesthesia team     The patient was brought into the operating room by the anesthesia team and the patient was placed in a supine position where the patient remained for the rest of the case  Anesthesia was able to establish a nasotracheal intubation without any complications  Care was then handed back to the OMFS team     Patient was draped in sterile manner timeout was performed in which the patient was correctly identified by name medical record number as well as a site of the procedure be performed  Once a timeout was completed oral cavity was thoroughly suctioned with the Yankauer suction the moist vaginal packing was used it as a throat pack  Patient was given local anesthesia at the sites of the fracture and IMF screws with 1% lidocaine with 1-100,000 epinephrine as local anesthesia per operating room record  We were able to manipulate the mandible to obtain good occlusion and to get adequate reduction of the mandible segments  Full-thickness mucoperiosteal flap was elevated at the site of tooth #3  The tooth was luxated with an elevator and extracted with a rongeur, site was curetted and irrigated with normal saline  Attention was then directed to the left maxilla  A full thickness mucoperiosteal flap was elevated at site #14  The tooth was luxated with an elevator and extracted with a rongeur  Attention was then directed to the right mandibular angle  15 blade was used to make an incision extending from the level of the ascending ramus through the mucosa and submucosa to the periosteum just distal to tooth #31  A sub-periosteal dissection was then carried out superiorly,anteriorly, and to the inferior border of the mandible  The fracture was visualized  The site of the fracture was grossly debrided using Rongeurs and curettes  Good approximation of the fracture segments was achieved  A Synthes prebent Champy plate was adapted to the right mandibular angle  This plate was secured with 8mm monocortical screws x4  Good reduction of the fracture was obtained  Patient's occlusion was good with bilateral balanced contacts  The surgical site was thoroughly irrigated with sterile saline  The incision was closed with 3-0 chromic gut suture in a running fashion  Hemostasis was achieved  Previously placed throat pack was removed and the and the oropharynx was suctioned thoroughly  All surgical sites were once again reevaluated and found to be hemostatic  Patient was placed back into intermaxillary fixation with 24-gauge interdental wires with good occlusion achieved       I was present for the entire procedure and A qualified resident physician was not available    Patient Disposition:  PACU  and extubated and stable      SIGNATURE: Tiffany Stephenson DMD  DATE: July 26, 2022  TIME: 5:02 PM

## 2022-07-26 NOTE — QUICK NOTE
Post operative note:  " I feel pretty good"  I would like to go home, I have to go to work tomorrow, just started a new job "    Objective:  Alert and Oriented  GCS - 13  Tolerating a liquid dysphagia diet  Enjoys the ensure  RRR, no complaint of chest pain  Lungs CTA bilaterally, no shortness of breath  Abdomen soft   Moving all extremities  Significant other and children in to see hime  They will be taking hime home  Discussed the use of the wire cutters and importance of keeping them with him at all times  Will print work note for patient  Will discharge home jeremy
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
## 2022-07-27 NOTE — UTILIZATION REVIEW
Observation Admission Authorization Request   NOTIFICATION OF OBSERVATION ADMISSION/OBSERVATION AUTHORIZATION REQUEST   SERVICING FACILITY:   [address] [facility], [address] [place] [address]  Tax ID: [id number]  NPI: [id number]  Place of Service: On [facility] Code: 22  CPT Code for Observation: CPT   CPT [facility]     ATTENDING PROVIDER:  Attending Name and NPI#: Aye Benjamin [[id number]]  Address[facility] [address] [facility], [address] [place] [address]  Phone: [phone]     UTILIZATION REVIEW CONTACT:  Mitra Ramirez Utilization   Network Utilization Review Department  Phone: [phone]  Fax: [phone]  Email: [email or web address]     PHYSICIAN ADVISORY SERVICES:  FOR [id number]-[id number] REVIEW - MEDICAL NECESSITY DENIAL  Phone: [phone]  Fax: [phone]  Email: [email or web address]     TYPE OF REQUEST:  Observation  Status     ADMISSION INFORMATION:  ADMISSION DATE/TIME:   PATIENT DIAGNOSIS CODE/DESCRIPTION:  Closed fracture of angle of mandible, initial encounter ([facility] [address] ) [S02 650A]  Injury of jaw [S09 93XA]  DISCHARGE DATE/TIME: 7/26/2022 10:30 PM   IMPORTANT INFORMATION:  Please contact Mitra Ramirez directly with any questions or concerns regarding this request  Department voicemails are confidential     Send requests for admission clinical reviews, concurrent reviews, approvals, and administrative denials due to lack of clinical to fax [phone]

## 2023-02-03 ENCOUNTER — APPOINTMENT (EMERGENCY)
Dept: RADIOLOGY | Facility: HOSPITAL | Age: 28
End: 2023-02-03

## 2023-02-03 ENCOUNTER — HOSPITAL ENCOUNTER (EMERGENCY)
Facility: HOSPITAL | Age: 28
Discharge: HOME | End: 2023-02-03
Attending: EMERGENCY MEDICINE

## 2023-02-03 VITALS
SYSTOLIC BLOOD PRESSURE: 143 MMHG | TEMPERATURE: 97 F | RESPIRATION RATE: 18 BRPM | HEART RATE: 66 BPM | DIASTOLIC BLOOD PRESSURE: 84 MMHG | OXYGEN SATURATION: 99 %

## 2023-02-03 DIAGNOSIS — S02.602A CLOSED FRACTURE OF LEFT SIDE OF MANDIBULAR BODY, INITIAL ENCOUNTER (HCC): Primary | ICD-10-CM

## 2023-02-04 NOTE — DISCHARGE INSTRUCTIONS
You were found to have a fracture of the left side of your mandible  Please follow up with oral surgery as an outpatient or return for evaluation tomorrow morning by the Oral surgery team     Return to the ED immediately if you have difficulty swallowing or breathing
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
## 2024-09-02 ENCOUNTER — HOSPITAL ENCOUNTER (EMERGENCY)
Facility: HOSPITAL | Age: 29
Discharge: HOME | End: 2024-09-02
Attending: EMERGENCY MEDICINE
Payer: COMMERCIAL

## 2024-09-02 VITALS
SYSTOLIC BLOOD PRESSURE: 143 MMHG | TEMPERATURE: 97.9 F | HEART RATE: 64 BPM | RESPIRATION RATE: 18 BRPM | DIASTOLIC BLOOD PRESSURE: 84 MMHG | OXYGEN SATURATION: 98 %
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
## 2024-09-02 PROCEDURE — 99284 EMERGENCY DEPT VISIT MOD MDM: CPT | Performed by: EMERGENCY MEDICINE

## 2024-09-02 PROCEDURE — 99283 EMERGENCY DEPT VISIT LOW MDM: CPT

## 2024-09-02 PROCEDURE — 90715 TDAP VACCINE 7 YRS/> IM: CPT | Performed by: EMERGENCY MEDICINE

## 2024-09-02 PROCEDURE — 90471 IMMUNIZATION ADMIN: CPT

## 2024-09-02 RX ADMIN — TETANUS TOXOID, REDUCED DIPHTHERIA TOXOID AND ACELLULAR PERTUSSIS VACCINE, ADSORBED 0.5 ML: 5; 2.5; 8; 8; 2.5 SUSPENSION INTRAMUSCULAR at 22:35

## 2024-09-02 RX ADMIN — AMOXICILLIN AND CLAVULANATE POTASSIUM 1 TABLET: 875; 125 TABLET, FILM COATED ORAL at 22:35
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
## 2024-10-09 ENCOUNTER — NEW PATIENT COMPREHENSIVE (OUTPATIENT)
Dept: URBAN - METROPOLITAN AREA CLINIC 6 | Facility: CLINIC | Age: 29
End: 2024-10-09

## 2024-10-09 DIAGNOSIS — H40.013: ICD-10-CM

## 2024-10-09 PROCEDURE — 92083 EXTENDED VISUAL FIELD XM: CPT

## 2024-10-09 PROCEDURE — 92004 COMPRE OPH EXAM NEW PT 1/>: CPT

## 2024-10-09 PROCEDURE — 92250 FUNDUS PHOTOGRAPHY W/I&R: CPT

## 2024-10-09 ASSESSMENT — TONOMETRY
OD_IOP_MMHG: 17
OS_IOP_MMHG: 14

## 2024-10-09 ASSESSMENT — VISUAL ACUITY
OD_SC: 20/20
OU_SC: J1+
OS_SC: 20/20

## (undated) DEVICE — MATRIXMANDIBLE 1.5MM DRILL BIT J-LATCH W/8MM STOP/50MM: Brand: MATRIXMANDIBLE

## (undated) DEVICE — MATRIXMANDIBLE 1.5MM DRILL BIT J-LATCH/90MM FOR 03.503.043: Brand: MATRIXMANDIBLE

## (undated) DEVICE — SUT CHROMIC 3-0 SH-1 27 IN G182H

## (undated) DEVICE — MAYO STAND COVER: Brand: CONVERTORS

## (undated) DEVICE — PAD GROUNDING ADULT

## (undated) DEVICE — GLOVE SRG BIOGEL 8

## (undated) DEVICE — STERILE MANDIBLE PACK: Brand: CARDINAL HEALTH

## (undated) DEVICE — DENTAL BURR SIDE WHITE

## (undated) DEVICE — 2000CC GUARDIAN II: Brand: GUARDIAN

## (undated) DEVICE — BATTERY PACK-STERILE FOR BATTERY POWERED DRIVER

## (undated) DEVICE — GLOVE INDICATOR PI UNDERGLOVE SZ 8.5 BLUE

## (undated) DEVICE — NEEDLE 25G X 1 1/2

## (undated) DEVICE — SYRINGE 10ML LL

## (undated) DEVICE — MATRIXMANDIBLE 1.5MM DRILL BIT J-LATCH W/6MM STOP/50MM: Brand: MATRIXMANDIBLE

## (undated) DEVICE — MATRIXMANDIBLE 1.5MM DRILL BIT J-LATCH W/12MM STOP/50MM: Brand: MATRIXMANDIBLE

## (undated) DEVICE — INTENDED FOR TISSUE SEPARATION, AND OTHER PROCEDURES THAT REQUIRE A SHARP SURGICAL BLADE TO PUNCTURE OR CUT.: Brand: BARD-PARKER ® CARBON RIB-BACK BLADES

## (undated) DEVICE — REM POLYHESIVE ADULT PATIENT RETURN ELECTRODE: Brand: VALLEYLAB

## (undated) DEVICE — GLOVE SRG BIOGEL 7

## (undated) DEVICE — GLOVE INDICATOR PI UNDERGLOVE SZ 6.5 BLUE

## (undated) DEVICE — GLOVE SRG BIOGEL ORTHOPEDIC 7.5

## (undated) DEVICE — SPONGE STICK WITH PVP-I: Brand: KENDALL